# Patient Record
Sex: FEMALE | Race: WHITE | NOT HISPANIC OR LATINO | URBAN - METROPOLITAN AREA
[De-identification: names, ages, dates, MRNs, and addresses within clinical notes are randomized per-mention and may not be internally consistent; named-entity substitution may affect disease eponyms.]

---

## 2020-03-20 ENCOUNTER — INPATIENT (INPATIENT)
Facility: HOSPITAL | Age: 80
LOS: 3 days | Discharge: NURSING, REHAB OR LONG-TERM CARE | DRG: 65 | End: 2020-03-24
Attending: FAMILY MEDICINE | Admitting: FAMILY MEDICINE
Payer: COMMERCIAL

## 2020-03-20 VITALS
OXYGEN SATURATION: 96 % | TEMPERATURE: 98 F | RESPIRATION RATE: 18 BRPM | SYSTOLIC BLOOD PRESSURE: 159 MMHG | DIASTOLIC BLOOD PRESSURE: 76 MMHG | WEIGHT: 151.02 LBS | HEART RATE: 64 BPM

## 2020-03-20 DIAGNOSIS — R93.5 ABNORMAL FINDINGS ON DIAGNOSTIC IMAGING OF OTHER ABDOMINAL REGIONS, INCLUDING RETROPERITONEUM: ICD-10-CM

## 2020-03-20 DIAGNOSIS — I10 ESSENTIAL (PRIMARY) HYPERTENSION: ICD-10-CM

## 2020-03-20 DIAGNOSIS — I63.9 CEREBRAL INFARCTION, UNSPECIFIED: ICD-10-CM

## 2020-03-20 DIAGNOSIS — S22.39XA FRACTURE OF ONE RIB, UNSPECIFIED SIDE, INITIAL ENCOUNTER FOR CLOSED FRACTURE: ICD-10-CM

## 2020-03-20 DIAGNOSIS — S52.209A UNSPECIFIED FRACTURE OF SHAFT OF UNSPECIFIED ULNA, INITIAL ENCOUNTER FOR CLOSED FRACTURE: Chronic | ICD-10-CM

## 2020-03-20 DIAGNOSIS — Z29.9 ENCOUNTER FOR PROPHYLACTIC MEASURES, UNSPECIFIED: ICD-10-CM

## 2020-03-20 DIAGNOSIS — E03.9 HYPOTHYROIDISM, UNSPECIFIED: ICD-10-CM

## 2020-03-20 DIAGNOSIS — R79.89 OTHER SPECIFIED ABNORMAL FINDINGS OF BLOOD CHEMISTRY: ICD-10-CM

## 2020-03-20 LAB
ALBUMIN SERPL ELPH-MCNC: 2.8 G/DL — LOW (ref 3.3–5)
ALP SERPL-CCNC: 45 U/L — SIGNIFICANT CHANGE UP (ref 40–120)
ALT FLD-CCNC: 59 U/L — SIGNIFICANT CHANGE UP (ref 12–78)
ANION GAP SERPL CALC-SCNC: 6 MMOL/L — SIGNIFICANT CHANGE UP (ref 5–17)
AST SERPL-CCNC: 64 U/L — HIGH (ref 15–37)
BASOPHILS # BLD AUTO: 0.03 K/UL — SIGNIFICANT CHANGE UP (ref 0–0.2)
BASOPHILS NFR BLD AUTO: 0.3 % — SIGNIFICANT CHANGE UP (ref 0–2)
BILIRUB SERPL-MCNC: 0.3 MG/DL — SIGNIFICANT CHANGE UP (ref 0.2–1.2)
BUN SERPL-MCNC: 17 MG/DL — SIGNIFICANT CHANGE UP (ref 7–23)
CALCIUM SERPL-MCNC: 9.3 MG/DL — SIGNIFICANT CHANGE UP (ref 8.5–10.1)
CHLORIDE SERPL-SCNC: 108 MMOL/L — SIGNIFICANT CHANGE UP (ref 96–108)
CO2 SERPL-SCNC: 24 MMOL/L — SIGNIFICANT CHANGE UP (ref 22–31)
CREAT SERPL-MCNC: 1.1 MG/DL — SIGNIFICANT CHANGE UP (ref 0.5–1.3)
EOSINOPHIL # BLD AUTO: 0 K/UL — SIGNIFICANT CHANGE UP (ref 0–0.5)
EOSINOPHIL NFR BLD AUTO: 0 % — SIGNIFICANT CHANGE UP (ref 0–6)
GLUCOSE SERPL-MCNC: 123 MG/DL — HIGH (ref 70–99)
HCT VFR BLD CALC: 31.4 % — LOW (ref 34.5–45)
HGB BLD-MCNC: 10.1 G/DL — LOW (ref 11.5–15.5)
IMM GRANULOCYTES NFR BLD AUTO: 0.9 % — SIGNIFICANT CHANGE UP (ref 0–1.5)
INR BLD: 1.21 RATIO — HIGH (ref 0.88–1.16)
LYMPHOCYTES # BLD AUTO: 1.42 K/UL — SIGNIFICANT CHANGE UP (ref 1–3.3)
LYMPHOCYTES # BLD AUTO: 16.3 % — SIGNIFICANT CHANGE UP (ref 13–44)
MCHC RBC-ENTMCNC: 30.3 PG — SIGNIFICANT CHANGE UP (ref 27–34)
MCHC RBC-ENTMCNC: 32.2 GM/DL — SIGNIFICANT CHANGE UP (ref 32–36)
MCV RBC AUTO: 94.3 FL — SIGNIFICANT CHANGE UP (ref 80–100)
MONOCYTES # BLD AUTO: 0.36 K/UL — SIGNIFICANT CHANGE UP (ref 0–0.9)
MONOCYTES NFR BLD AUTO: 4.1 % — SIGNIFICANT CHANGE UP (ref 2–14)
NEUTROPHILS # BLD AUTO: 6.81 K/UL — SIGNIFICANT CHANGE UP (ref 1.8–7.4)
NEUTROPHILS NFR BLD AUTO: 78.4 % — HIGH (ref 43–77)
NRBC # BLD: 0 /100 WBCS — SIGNIFICANT CHANGE UP (ref 0–0)
PLATELET # BLD AUTO: 241 K/UL — SIGNIFICANT CHANGE UP (ref 150–400)
POTASSIUM SERPL-MCNC: 4 MMOL/L — SIGNIFICANT CHANGE UP (ref 3.5–5.3)
POTASSIUM SERPL-SCNC: 4 MMOL/L — SIGNIFICANT CHANGE UP (ref 3.5–5.3)
PROT SERPL-MCNC: 10 G/DL — HIGH (ref 6–8.3)
PROTHROM AB SERPL-ACNC: 13.6 SEC — HIGH (ref 10–12.9)
RBC # BLD: 3.33 M/UL — LOW (ref 3.8–5.2)
RBC # FLD: 13.3 % — SIGNIFICANT CHANGE UP (ref 10.3–14.5)
SODIUM SERPL-SCNC: 138 MMOL/L — SIGNIFICANT CHANGE UP (ref 135–145)
WBC # BLD: 8.7 K/UL — SIGNIFICANT CHANGE UP (ref 3.8–10.5)
WBC # FLD AUTO: 8.7 K/UL — SIGNIFICANT CHANGE UP (ref 3.8–10.5)

## 2020-03-20 PROCEDURE — 70498 CT ANGIOGRAPHY NECK: CPT | Mod: 26

## 2020-03-20 PROCEDURE — 99285 EMERGENCY DEPT VISIT HI MDM: CPT

## 2020-03-20 PROCEDURE — 93306 TTE W/DOPPLER COMPLETE: CPT | Mod: 26

## 2020-03-20 PROCEDURE — 70450 CT HEAD/BRAIN W/O DYE: CPT | Mod: 26,59

## 2020-03-20 PROCEDURE — 71045 X-RAY EXAM CHEST 1 VIEW: CPT | Mod: 26

## 2020-03-20 PROCEDURE — 70496 CT ANGIOGRAPHY HEAD: CPT | Mod: 26

## 2020-03-20 PROCEDURE — 74177 CT ABD & PELVIS W/CONTRAST: CPT | Mod: 26

## 2020-03-20 RX ORDER — ONDANSETRON 8 MG/1
4 TABLET, FILM COATED ORAL ONCE
Refills: 0 | Status: COMPLETED | OUTPATIENT
Start: 2020-03-20 | End: 2020-03-20

## 2020-03-20 RX ORDER — LEVOTHYROXINE SODIUM 125 MCG
25 TABLET ORAL DAILY
Refills: 0 | Status: DISCONTINUED | OUTPATIENT
Start: 2020-03-20 | End: 2020-03-24

## 2020-03-20 RX ORDER — ATORVASTATIN CALCIUM 80 MG/1
40 TABLET, FILM COATED ORAL AT BEDTIME
Refills: 0 | Status: DISCONTINUED | OUTPATIENT
Start: 2020-03-20 | End: 2020-03-24

## 2020-03-20 RX ORDER — ASPIRIN/CALCIUM CARB/MAGNESIUM 324 MG
325 TABLET ORAL DAILY
Refills: 0 | Status: DISCONTINUED | OUTPATIENT
Start: 2020-03-20 | End: 2020-03-24

## 2020-03-20 RX ORDER — SODIUM CHLORIDE 9 MG/ML
1000 INJECTION INTRAMUSCULAR; INTRAVENOUS; SUBCUTANEOUS ONCE
Refills: 0 | Status: COMPLETED | OUTPATIENT
Start: 2020-03-20 | End: 2020-03-20

## 2020-03-20 RX ORDER — ENOXAPARIN SODIUM 100 MG/ML
40 INJECTION SUBCUTANEOUS DAILY
Refills: 0 | Status: DISCONTINUED | OUTPATIENT
Start: 2020-03-20 | End: 2020-03-24

## 2020-03-20 RX ORDER — LEVOTHYROXINE SODIUM 125 MCG
1 TABLET ORAL
Qty: 0 | Refills: 0 | DISCHARGE

## 2020-03-20 RX ORDER — ASPIRIN/CALCIUM CARB/MAGNESIUM 324 MG
325 TABLET ORAL ONCE
Refills: 0 | Status: COMPLETED | OUTPATIENT
Start: 2020-03-20 | End: 2020-03-20

## 2020-03-20 RX ADMIN — Medication 325 MILLIGRAM(S): at 18:53

## 2020-03-20 RX ADMIN — SODIUM CHLORIDE 1000 MILLILITER(S): 9 INJECTION INTRAMUSCULAR; INTRAVENOUS; SUBCUTANEOUS at 16:39

## 2020-03-20 RX ADMIN — ATORVASTATIN CALCIUM 40 MILLIGRAM(S): 80 TABLET, FILM COATED ORAL at 21:33

## 2020-03-20 RX ADMIN — ONDANSETRON 4 MILLIGRAM(S): 8 TABLET, FILM COATED ORAL at 16:39

## 2020-03-20 NOTE — H&P ADULT - PROBLEM SELECTOR PLAN 7
-Lovenox 40mg QD    IMPROVE VTE Individual Risk Assessment  RISK                                                                Points  [  ] Previous VTE                                                  3  [  ] Thrombophilia                                               2  [  ] Lower limb paralysis                                      2        (unable to hold up >15 seconds)    [  ] Current Cancer                                              2         (within 6 months)  [  ] Immobilization > 24 hrs                                1  [  ] ICU/CCU stay > 24 hours                              1  [ X ] Age > 60                                                      1  IMPROVE VTE Score _______1__  IMPROVE Score 0-1: Low Risk, No VTE prophylaxis required for most patients, encourage ambulation.   IMPROVE Score 2-3: At risk, pharmacologic VTE prophylaxis is indicated for most patients (in the absence of a contraindication)  IMPROVE Score > or = 4: High Risk, pharmacologic VTE prophylaxis is indicated for most patients (in the absence of a contraindication)

## 2020-03-20 NOTE — H&P ADULT - PROBLEM SELECTOR PLAN 5
-CT abdomen pelvis w/ IV contrast performed for r/o obstruction, however showed multiple left rib fractures involving the 7th -11th ribs. Fracture of the left posterior 10th rib is comminuted and displaced.  -patient currently not in any pain, saturating well  -continue to monitor saturations -CT abdomen pelvis w/ IV contrast performed for r/o obstruction, however showed multiple left rib fractures involving the 7th -11th ribs. Fracture of the left posterior 10th rib is comminuted and displaced.  -patient currently not in any pain, saturating well  -continue to monitor saturations  -incentive spirometry

## 2020-03-20 NOTE — ED ADULT NURSE NOTE - OBJECTIVE STATEMENT
pt states her leg gave out and she fell, landing on her buttocks. pt also states she had a few second episode of lightheadedness. pt able to follow commands. speaking in clear sentences. pt able to move all extremities.

## 2020-03-20 NOTE — ED ADULT TRIAGE NOTE - CHIEF COMPLAINT QUOTE
Pt bib ems for l sided weakness and slurred speech per family, last checked on patient 1130 am, pt was delayed, but at baseline status

## 2020-03-20 NOTE — H&P ADULT - NSHPPHYSICALEXAM_GEN_ALL_CORE
Physical Exam  General: No apparent distress  Head: normocephalic, atraumatic  Eyes: EOMI, anicteric  ENT: moist mucous membranes, no pharyngeal exudates  Heart: RRR, S1, S2, no murmurs  Chest: CTA b/l, no rales, rhonchi, or wheezes  Abd: BS+, soft, NT, ND  Back: no CVA tenderness  Extr: no edema or cyanosis, 5/5 strength in all 4 extremities (some weakness with  on R arm however baseline s/p ulnar fixation)  Neuro: AA&Ox3, no focal weakness, sensation to light touch intact, CN 2-12 intact, no neurological deficits  Psych: normal affect

## 2020-03-20 NOTE — H&P ADULT - PROBLEM SELECTOR PLAN 1
-admit to tele  -patient with acute right parietal lobe infarct however no neurological deficits present at this time  -patient passed bedside dysphagia screen- will start on full diet  -hold home anti-HTN meds to allow for permissive HTN  -f/u speech and swallow  -f/u MR brain  -s/p asa 325mg in the ED, will continue with asa 325mg   -f/u echo  -f/u TSH, HbA1C, lipid profile  -PT consult  -fall precautions  -neuro, Dr. Shankar, consulted, f/u recs -admit to tele  -patient with acute right parietal lobe infarct however no neurological deficits present at this time  -patient passed bedside dysphagia screen- will start on full diet  -hold home anti-HTN meds to allow for permissive HTN  -start atorvastatin 40mg QD  -f/u speech and swallow  -f/u MR brain  -f/u echo  -s/p asa 325mg in the ED, will continue with asa 325mg   -f/u TSH, HbA1C, lipid profile  -neuro checks   -PT consult  -fall precautions  -neuro, Dr. Shankar, consulted, f/u recs

## 2020-03-20 NOTE — H&P ADULT - ASSESSMENT
80F PMH of HTN and hypothyroidism presenting with generalized weakness, difficulty walking and dizziness. Admitted for acute parietal lobe infarct. 80F PMH of HTN and hypothyroidism presenting with generalized weakness, difficulty walking and dizziness. Admitted for acute right parietal lobe infarct.

## 2020-03-20 NOTE — H&P ADULT - PROBLEM SELECTOR PLAN 6
-patient noted to have focal dilatation of the main pancreatic duct in the head measuring9 mm. Indeterminant 1.5 cm left adrenal nodule. Follow-up nonemergent MRI is recommended.  To be done outpatient.  -Abnormal thickening of the endometrium. Correlate with nonemergent pelvic ultrasound. To be done outpatient.

## 2020-03-20 NOTE — H&P ADULT - NSTOBACCOSCREENHP_GEN_A_CS
Hospital Discharge Documentation  Please phone to schedule a hospital follow up appointment. No discharge summary available at this time, below is the most recent progress note  for your review .       From: [id number] [facility]  Phone: [phone] SOCIAL HISTORY:  No tobacco, alcohol, or drug use. Lives with her family.   Usually independent in her basic activities of daily living.       REVIEW OF SYSTEMS:  The patient describes chest discomfort described as heaviness in the mid sternal area that co d:     08/20/2019 19:25:54  t:      08/20/2019 20:04:21  [facility]   [id number]  FB/               Last signed by: Mikayla Griffiths MD at 8/21/2019 10:35 AM No

## 2020-03-20 NOTE — H&P ADULT - PROBLEM SELECTOR PLAN 2
-patient noted to have elevated trop on admission, likely 2/2 demand ischemia in setting of acute CVA  -will trend for 2 more sets, patient currently asymptomatic  -f/u EKG -patient noted to have elevated trop on admission, likely 2/2 demand ischemia in setting of acute CVA  -will trend for 2 more sets, patient currently asymptomatic  -f/u EKG  -f/u echo -patient noted to have elevated trop on admission, likely 2/2 demand ischemia in setting of acute CVA  -will trend for 2 more sets, patient currently asymptomatic  -f/u EKG  -f/u echo  -Cardio, [facility], consulted, f/u recs

## 2020-03-20 NOTE — H&P ADULT - PROBLEM SELECTOR PLAN 3
-patient on metoprolol 50mg BID at home, will hold for now to allow for permissive HTN  -restart in 48 hours or pending neuro recs

## 2020-03-20 NOTE — H&P ADULT - NSHPSOCIALHISTORY_GEN_ALL_CORE
patient ambulates independently and able to perform ADLs on her own  lives at home with   denies smoking history, EtOH, or illicit drug use

## 2020-03-20 NOTE — ED PROVIDER NOTE - OBJECTIVE STATEMENT
80 female presents to ER by ambulance with daughter at the bedside states patient woke up not feeling well today, having generalized weakness, difficulty walking, dizziness, tried to eat and dry heaving, mild headache.

## 2020-03-20 NOTE — PATIENT PROFILE ADULT - HCP AGENT'S NAME
Jolanta Armstrongaia516- [phone](c) dtr Jolanta Dmespchq252- [phone](c)[phone](w)[phone](w) [place]

## 2020-03-20 NOTE — ED PROVIDER NOTE - CPE EDP CARDIAC NORM
NOTIFICATION RETURN TO WORK / SCHOOL 
 
2/24/2019 10:42 AM 
 
Ms. Gerardo Ramos [facility] [id number] [facility] [address] To Whom It May Concern: 
 
Gerardo Hernandez is currently under the care of [address] [place] [address]. She will return to work/school on: 2/25/19 If there are questions or concerns please have the patient contact our office. Sincerely, Hermelinda Cardoso MD 
 
                                
 
 normal...

## 2020-03-20 NOTE — H&P ADULT - HISTORY OF PRESENT ILLNESS
80 year old F PMH HTN and hypothyroidism presenting with generalized weakness, difficulty walking and dizziness.  Patient states that she was feeling fine yesterday however today felt like she had a frontal headache.  Patient at breakfast this morning and was able to keep it down, however had headache and took tylenol around 11:30am however felt nauseous and went to the bathroom.  She threw up tylenol and then felt a bit lightheaded and was walking towards bedroom when she fell in hallway.  Patient denies palpitations or diaphoresis prior to fall, denies feeling dizzy and states her R leg gave out.  Patient denies head trauma or LOC and was awake during the whole episode, her daughter was called who tried to get her up however she was unable to help get patient up (patient also felt week and was not able to get herself up).  Patient was then brought to ER by ambulance.  Patient states that she currently feels fine, no weakness or dizziness/ lightheadedness, denies any loss of sensation or weakness in any of the extremities.  Patient states that she had R wrist surgery, and therefore has some mild weakness for     In the ED, patient's vitals were: T97.7F, HR 64, /76, RR 18 and SpO2 96% on RA. Significant labs include: H/H 10.1/31.4, AST 64 and ALT 59. Trop .247.   CT Head: Acute right parietal lobe infarct.   CTA: No large vessel occlusion.  CTA head: Trace right cavernous carotid artery calcifications.  The [place] and vertebrobasilar system are unremarkable without evidence of stenosis, occlusion or saccular aneurysm dilation. No evidence for arterial venous malformation. The vertebral arteries are codominant.  CTA NECK: Trace left carotid bulb calcifications. A left-sided aortic arch is demonstrated. There is normal relationship to the great vessels. The common carotid arteries, internal carotid arteries and vertebral arteries are normal in caliber. No evidence of stenosis, occlusion or saccular aneurysm dilation. The vertebral arteries are codominant.  EKG: pending  Pt given 1L normal saline, asa 325mg PO and Zofran 4mg IV x1. 80 year old F PMH HTN and hypothyroidism presenting with generalized weakness, difficulty walking and dizziness.  Patient states that she was feeling fine yesterday however today felt like she had a frontal headache.  Patient at breakfast this morning and was able to keep it down, however had headache and took tylenol around 11:30am however felt nauseous and went to the bathroom.  She threw up tylenol and then felt a bit lightheaded and was walking towards bedroom when she fell in hallway.  Patient denies palpitations or diaphoresis prior to fall, denies feeling dizzy and states her R leg gave out.  Patient denies head trauma or LOC and was awake during the whole episode, her daughter was called who tried to get her up however she was unable to help get patient up (patient also felt week and was not able to get herself up).  Patient was then brought to ER by ambulance.  Patient states that she currently feels fine, no weakness or dizziness/ lightheadedness, denies any loss of sensation or weakness in any of the extremities.  Patient states that she had R wrist surgery, and therefore has some mild weakness however right now is not different from baseline.  Denies chest pain, palpitations, SOB, cough, abdominal pain, not currently nauseous, diarrhea/ constipation, weakness or loss of sensation, no sick contacts or COVID exposure.    In the ED, patient's vitals were: T97.7F, HR 64, /76, RR 18 and SpO2 96% on RA. Significant labs include: H/H 10.1/31.4, AST 64 and ALT 59. Trop .247.   CT Head: Acute right parietal lobe infarct.   CTA: No large vessel occlusion.  CTA head: Trace right cavernous carotid artery calcifications.  The [place] and vertebrobasilar system are unremarkable without evidence of stenosis, occlusion or saccular aneurysm dilation. No evidence for arterial venous malformation. The vertebral arteries are codominant.  CTA NECK: Trace left carotid bulb calcifications. A left-sided aortic arch is demonstrated. There is normal relationship to the great vessels. The common carotid arteries, internal carotid arteries and vertebral arteries are normal in caliber. No evidence of stenosis, occlusion or saccular aneurysm dilation. The vertebral arteries are codominant.  CTA abdomen/ pelvis with IV contrast: Multiple left rib fractures involving the 7th -11th ribs. Fracture of the left posterior 10th rib is comminuted and displaced. Focal dilatation of the main pancreatic duct in the head measuring9 mm. Indeterminant 1.5 cm left adrenal nodule. Follow-up nonemergent MRI is recommended. Abnormal thickening of the endometrium. Correlate with nonemergent pelvic ultrasound.  EKG: pending  Pt given 1L normal saline, asa 325mg PO and Zofran 4mg IV x1.

## 2020-03-20 NOTE — H&P ADULT - NSHPREVIEWOFSYSTEMS_GEN_ALL_CORE
Review of Systems  General: no fever, chills  Eyes: no vision changes  ENT: no hearing changes, nasal congestions, or sore throat  CV: no chest pain or palpitations  Pulm: no SOB, wheezing, cough, or hemoptysis  Abd/GI: no nausea, vomiting, diarrhea, constipation, abd pain  : no dysuria, hematuria, urinary frequency  MSK: no joint pain or myalgias  Neuro: no syncope, dizziness, focal weakness  Psych: no anxiety or depression  Endo: no heat or cold intolerance

## 2020-03-21 ENCOUNTER — TRANSCRIPTION ENCOUNTER (OUTPATIENT)
Age: 80
End: 2020-03-21

## 2020-03-21 LAB
ANION GAP SERPL CALC-SCNC: 8 MMOL/L — SIGNIFICANT CHANGE UP (ref 5–17)
BUN SERPL-MCNC: 20 MG/DL — SIGNIFICANT CHANGE UP (ref 7–23)
CALCIUM SERPL-MCNC: 9.3 MG/DL — SIGNIFICANT CHANGE UP (ref 8.5–10.1)
CHLORIDE SERPL-SCNC: 109 MMOL/L — HIGH (ref 96–108)
CHOLEST SERPL-MCNC: 168 MG/DL — SIGNIFICANT CHANGE UP (ref 10–199)
CK MB BLD-MCNC: 2.2 % — SIGNIFICANT CHANGE UP (ref 0–3.5)
CK MB BLD-MCNC: 2.8 % — SIGNIFICANT CHANGE UP (ref 0–3.5)
CK MB CFR SERPL CALC: 2 NG/ML — SIGNIFICANT CHANGE UP (ref 0–3.6)
CK MB CFR SERPL CALC: 2.7 NG/ML — SIGNIFICANT CHANGE UP (ref 0–3.6)
CK SERPL-CCNC: 93 U/L — SIGNIFICANT CHANGE UP (ref 26–192)
CK SERPL-CCNC: 97 U/L — SIGNIFICANT CHANGE UP (ref 26–192)
CO2 SERPL-SCNC: 21 MMOL/L — LOW (ref 22–31)
CREAT SERPL-MCNC: 1.1 MG/DL — SIGNIFICANT CHANGE UP (ref 0.5–1.3)
GLUCOSE SERPL-MCNC: 86 MG/DL — SIGNIFICANT CHANGE UP (ref 70–99)
HBA1C BLD-MCNC: 5.8 % — HIGH (ref 4–5.6)
HCT VFR BLD CALC: 30.4 % — LOW (ref 34.5–45)
HDLC SERPL-MCNC: 49 MG/DL — LOW
HGB BLD-MCNC: 9.7 G/DL — LOW (ref 11.5–15.5)
LIPID PNL WITH DIRECT LDL SERPL: 95 MG/DL — SIGNIFICANT CHANGE UP
MCHC RBC-ENTMCNC: 30.4 PG — SIGNIFICANT CHANGE UP (ref 27–34)
MCHC RBC-ENTMCNC: 31.9 GM/DL — LOW (ref 32–36)
MCV RBC AUTO: 95.3 FL — SIGNIFICANT CHANGE UP (ref 80–100)
NRBC # BLD: 0 /100 WBCS — SIGNIFICANT CHANGE UP (ref 0–0)
PLATELET # BLD AUTO: 223 K/UL — SIGNIFICANT CHANGE UP (ref 150–400)
POTASSIUM SERPL-MCNC: 3.9 MMOL/L — SIGNIFICANT CHANGE UP (ref 3.5–5.3)
POTASSIUM SERPL-SCNC: 3.9 MMOL/L — SIGNIFICANT CHANGE UP (ref 3.5–5.3)
RBC # BLD: 3.19 M/UL — LOW (ref 3.8–5.2)
RBC # FLD: 13.4 % — SIGNIFICANT CHANGE UP (ref 10.3–14.5)
SODIUM SERPL-SCNC: 138 MMOL/L — SIGNIFICANT CHANGE UP (ref 135–145)
TOTAL CHOLESTEROL/HDL RATIO MEASUREMENT: 3.4 RATIO — SIGNIFICANT CHANGE UP (ref 3.3–7.1)
TRIGL SERPL-MCNC: 119 MG/DL — SIGNIFICANT CHANGE UP (ref 10–149)
TROPONIN I SERPL-MCNC: 0.38 NG/ML — HIGH (ref 0.01–0.04)
TROPONIN I SERPL-MCNC: 0.42 NG/ML — HIGH (ref 0.01–0.04)
TSH SERPL-MCNC: 1.89 UIU/ML — SIGNIFICANT CHANGE UP (ref 0.36–3.74)
WBC # BLD: 8.76 K/UL — SIGNIFICANT CHANGE UP (ref 3.8–10.5)
WBC # FLD AUTO: 8.76 K/UL — SIGNIFICANT CHANGE UP (ref 3.8–10.5)

## 2020-03-21 PROCEDURE — 99233 SBSQ HOSP IP/OBS HIGH 50: CPT

## 2020-03-21 PROCEDURE — 70551 MRI BRAIN STEM W/O DYE: CPT | Mod: 26

## 2020-03-21 PROCEDURE — 99223 1ST HOSP IP/OBS HIGH 75: CPT

## 2020-03-21 RX ADMIN — Medication 25 MICROGRAM(S): at 05:33

## 2020-03-21 RX ADMIN — ATORVASTATIN CALCIUM 40 MILLIGRAM(S): 80 TABLET, FILM COATED ORAL at 22:38

## 2020-03-21 RX ADMIN — ENOXAPARIN SODIUM 40 MILLIGRAM(S): 100 INJECTION SUBCUTANEOUS at 12:37

## 2020-03-21 RX ADMIN — Medication 325 MILLIGRAM(S): at 12:37

## 2020-03-21 NOTE — CONSULT NOTE ADULT - SUBJECTIVE AND OBJECTIVE BOX
Clinical impression is most likely right hemispheric cerebrovascular accident.    I would recommend telemetry evaluation to rule out underlying arrhythmia.  I would recommend echocardiogram to evaluate ejection fraction.  I would recommend MRI of the brain.  I would recommend to check TSH,lipid panel  and A1C  Avoid hypotension, allow permissive hypertension, keep SBP above 150 and below 190 if possible   Would recommend aspirin 325 once a day for 3 weeks and can cut down to 81.  I will recommend cholesterol medications.  I will get Physical Therapy and Occupational Therapy.  spoke to family

## 2020-03-21 NOTE — SPEECH LANGUAGE PATHOLOGY EVALUATION - COMMENTS
Chart reviewed order received for speech evaluation.  Pt received sleeping in bed, arousable with cues, lethargic, cues required to maintain arousal, Ox2, reduced cognition, reduced attention to left, +dysarthria, pain scale 0/10 pre & post eval.   Attempted to complete speech eval, however, due to lethargy, unable to be completed at this time.  Pt left as received NAD RN & MD notified.  Will follow to complete speech eval as schedule permits.

## 2020-03-21 NOTE — SWALLOW BEDSIDE ASSESSMENT ADULT - SLP GENERAL OBSERVATIONS
Pt received sleeping in bed, arousable with cues, lethargic, cues required to maintain arousal, Ox2, reduced cognition, reduced attention to left, +dysarthria, pain scale 0/10 pre & post eval

## 2020-03-21 NOTE — DISCHARGE NOTE PROVIDER - NSDCCPCAREPLAN_GEN_ALL_CORE_FT
PRINCIPAL DISCHARGE DIAGNOSIS  Diagnosis: Acute cerebrovascular accident (CVA)  Assessment and Plan of Treatment: You had a right parietal stroke. You have been improving and will continue you rehab in acute rehab facility. There was some evidence of atrial fibrillation on telemetry monitoring and you should start on anticoagulation in the near future. As per neurologist, you should take aspirin 325mg once daily for the next 10 days and on 4/4/20, if approved by cardiologist, you can STOP aspirin and start a full dose anticoagulant like Eliquis. You must see Dr. Walsh (cardiologist, number below) in early April to make that final decision and give you recommendation on blood thinner to start.   Your swallowing was affected by the stroke and for now speech and swallow therapists recommend a dysphagia 1 (pureed solids) with nectar-consistency thickened liquids for now. As you recover, consider another evaluation by speech and swallow therapist to see whether you can advance the diet further.  Follow up with neurologist, Dr. Marie (number below) after rehab.      SECONDARY DISCHARGE DIAGNOSES  Diagnosis: Paraproteinemia  Assessment and Plan of Treatment: You were found to have an abnormal protein in your blood that will need further evaluation with hematologist, Dr. Escobar (number below) as outpatient. Please follow up with hematologist as soon as you are you are out of rehab.    Diagnosis: Abnormal CT of the abdomen  Assessment and Plan of Treatment: You had several incidental findings on CT Abdomen that you will need to follow up as outpatient after rehab. You had thickening in the endometrium, an adrenal nodule, and some dilatation of a pancreatic duct. Please show your CT report and CT scan disc that was provided to your PMD after discharge from rehab and can help direct you to any appropriate spcialist or scans.    Diagnosis: Essential hypertension  Assessment and Plan of Treatment: Decrese your metoprolol dose to 25mg twice daily for now. Monitor BP in rehab. Follow up with your PMD. PRINCIPAL DISCHARGE DIAGNOSIS  Diagnosis: Acute cerebrovascular accident (CVA)  Assessment and Plan of Treatment: You had a right parietal stroke. You have been improving and will continue you rehab in acute rehab facility. There was some evidence of atrial fibrillation on telemetry monitoring and you should start on anticoagulation in the near future. As per neurologist, you should take aspirin 325mg once daily for the next 10 days and on 4/4/20, if approved by cardiologist, you can STOP aspirin and start a full dose anticoagulant like Eliquis. You must see Dr. Walsh (cardiologist, number below) in early April to make that final decision and give you recommendation on blood thinner to start. Start taking lipitor as prescribed.  Your swallowing was affected by the stroke and for now speech and swallow therapists recommend a dysphagia 1 (pureed solids) with nectar-consistency thickened liquids for now. As you recover, consider another evaluation by speech and swallow therapist to see whether you can advance the diet further.  Follow up with neurologist, Dr. Marie (number below) after rehab.      SECONDARY DISCHARGE DIAGNOSES  Diagnosis: Paraproteinemia  Assessment and Plan of Treatment: You were found to have an abnormal protein in your blood that will need further evaluation with hematologist, Dr. Escobar (number below) as outpatient. Please follow up with hematologist as soon as you are you are out of rehab.    Diagnosis: Abnormal CT of the abdomen  Assessment and Plan of Treatment: You had several incidental findings on CT Abdomen that you will need to follow up as outpatient after rehab. You had thickening in the endometrium, an adrenal nodule, and some dilatation of a pancreatic duct. Please show your CT report and CT scan disc that was provided to your PMD after discharge from rehab and can help direct you to any appropriate spcialist or scans.    Diagnosis: Essential hypertension  Assessment and Plan of Treatment: Decrese your metoprolol dose to 25mg twice daily for now. Monitor BP in rehab. Follow up with your PMD.    Diagnosis: Hypophosphatemia  Assessment and Plan of Treatment: Your phosphorus level was low. Take the potassium phosphate supplement with food for TWO DAYS ONLY. Recheck electrolytes in rehab.

## 2020-03-21 NOTE — PROGRESS NOTE ADULT - PROBLEM SELECTOR PLAN 5
-CT abdomen pelvis w/ IV contrast performed for r/o obstruction, however showed multiple left rib fractures involving the 7th -11th ribs. Fracture of the left posterior 10th rib is comminuted and displaced.  -patient currently not in any pain, saturating well  -continue to monitor saturations  -incentive spirometry  -will assess acuity of rib fx with radiology, consider pulm consult

## 2020-03-21 NOTE — PROGRESS NOTE ADULT - PROBLEM SELECTOR PLAN 6
-patient noted to have focal dilatation of the main pancreatic duct in the head measuring 9 mm. Indeterminant 1.5 cm left adrenal nodule. Follow-up nonemergent MRI is recommended.  To be done outpatient.  -Abnormal thickening of the endometrium. Correlate with nonemergent pelvic ultrasound. To be done outpatient.

## 2020-03-21 NOTE — DISCHARGE NOTE PROVIDER - NSDCMRMEDTOKEN_GEN_ALL_CORE_FT
levothyroxine 25 mcg (0.025 mg) oral tablet: 1 tab(s) orally once a day  Metoprolol Tartrate 50 mg oral tablet: 1 tab(s) orally 2 times a day aspirin 325 mg oral delayed release tablet: 1 tab(s) orally once a day  atorvastatin 40 mg oral tablet: 1 tab(s) orally once a day (at bedtime)  enoxaparin: 40 milligram(s) subcutaneous once a day  levothyroxine 25 mcg (0.025 mg) oral tablet: 1 tab(s) orally once a day  metoprolol tartrate 25 mg oral tablet: 1 tab(s) orally 2 times a day  potassium phosphate-sodium phosphate 305 mg-700 mg oral tablet: 1 tab(s) orally 3 times a day (with meals) FOR TWO DAYS ONLY

## 2020-03-21 NOTE — DISCHARGE NOTE PROVIDER - CARE PROVIDERS DIRECT ADDRESSES
,[email or web address],DirectAddress_Unknown,DirectAddress_Unknown

## 2020-03-21 NOTE — DISCHARGE NOTE PROVIDER - PROVIDER TOKENS
PROVIDER:[TOKEN:[[id number]]],PROVIDER:[TOKEN:[[id number]]],PROVIDER:[TOKEN:[[id number]]]

## 2020-03-21 NOTE — CONSULT NOTE ADULT - ASSESSMENT
Patient is admitted with an acute right hemispheric stroke currently undergoing neurology evaluation. The etiology of her stroke remains unclear with no clear carotid stenosis and the possibility of a cardioembolic phenomenon. Preliminary echocardiography does not reveal any evidence of a cardioembolic cause for her event. Her troponin is elevated but does not reveal a curve consistent with acute atherothrombosis. This is of no clinical significance at present. She remains hemodynamically stable currently in sinus rhythm    Recommend    Continue telemetry to evaluate for dysrhythmias    May consider implantable loop recorder in the future    May consider transesophageal echocardiography in the future    Blood pressure control with permissive hypertension, systolic 150-190    Continue statin    Antiplatelet therapy per neurology    Further management can be dependent on her clinical course

## 2020-03-21 NOTE — DISCHARGE NOTE PROVIDER - NSDCQMSTROKE_NEU_ALL_CORE
Yes... Uncomplicated asthma, unspecified asthma severity Uncomplicated asthma, unspecified asthma severity Uncomplicated asthma, unspecified asthma severity Uncomplicated asthma, unspecified asthma severity

## 2020-03-21 NOTE — DISCHARGE NOTE PROVIDER - HOSPITAL COURSE
FROM ADMISSION H+P:     HPI:    80 year old F PMH HTN and hypothyroidism presenting with generalized weakness, difficulty walking and dizziness.  Patient states that she was feeling fine yesterday however today felt like she had a frontal headache.  Patient at breakfast this morning and was able to keep it down, however had headache and took tylenol around 11:30am however felt nauseous and went to the bathroom.  She threw up tylenol and then felt a bit lightheaded and was walking towards bedroom when she fell in hallway.  Patient denies palpitations or diaphoresis prior to fall, denies feeling dizzy and states her R leg gave out.  Patient denies head trauma or LOC and was awake during the whole episode, her daughter was called who tried to get her up however she was unable to help get patient up (patient also felt week and was not able to get herself up).  Patient was then brought to ER by ambulance.  Patient states that she currently feels fine, no weakness or dizziness/ lightheadedness, denies any loss of sensation or weakness in any of the extremities.  Patient states that she had R wrist surgery, and therefore has some mild weakness however right now is not different from baseline.  Denies chest pain, palpitations, SOB, cough, abdominal pain, not currently nauseous, diarrhea/ constipation, weakness or loss of sensation, no sick contacts or COVID exposure.        In the ED, patient's vitals were: T97.7F, HR 64, /76, RR 18 and SpO2 96% on RA. Significant labs include: H/H 10.1/31.4, AST 64 and ALT 59. Trop .247.     CT Head: Acute right parietal lobe infarct.     CTA: No large vessel occlusion.    CTA head: Trace right cavernous carotid artery calcifications.    The [place] and vertebrobasilar system are unremarkable without evidence of stenosis, occlusion or saccular aneurysm dilation. No evidence for arterial venous malformation. The vertebral arteries are codominant.    CTA NECK: Trace left carotid bulb calcifications. A left-sided aortic arch is demonstrated. There is normal relationship to the great vessels. The common carotid arteries, internal carotid arteries and vertebral arteries are normal in caliber. No evidence of stenosis, occlusion or saccular aneurysm dilation. The vertebral arteries are codominant.    CTA abdomen/ pelvis with IV contrast: Multiple left rib fractures involving the 7th -11th ribs. Fracture of the left posterior 10th rib is comminuted and displaced. Focal dilatation of the main pancreatic duct in the head measuring9 mm. Indeterminant 1.5 cm left adrenal nodule. Follow-up nonemergent MRI is recommended. Abnormal thickening of the endometrium. Correlate with nonemergent pelvic ultrasound.    EKG: pending    Pt given 1L normal saline, asa 325mg PO and Zofran 4mg IV x1. (20 Mar 2020 18:42)            ---    HOSPITAL COURSE:     The patient was admitted to telemetry for management of her right parietal lobe infarct and paroxysmal atrial fibrillation. Her home BP meds were held to allow for permissive hypertension; however the patient's blood pressure was not particularly high so she was started on NS as per neuro (Dr Shankar) recommendations. She was started on  mg and atorvastatin 40 mg qhs. In the setting of her paroxysmal a fib, it was recommended that the patient not be started on full dose AC for three weeks due to risk of hemorrhagic conversion of infarct, as per neuro recommendations. The patient was evaluated by speech and swallow, who recommended a dysphagia 1 pureed diet with no liquids, with eventual addition of nectar thick liquids. TTE was performed which showed normal systolic LV function, LVEF of 55-60%, + diastolic dysfunction. Carotid arteries were without significant stenosis.         Patient had elevated troponin on admission; likely secondary to demand ischemia in the setting of CVA. She had some episodes of paroxysmal afib on the monitor. As per discussion with cardio (Dr Randle), patient could benefit from ILR for further evaluation of paroxysmal afib, as well as outpatient stress test.         CT A/P showed rib fractures involving the 7-11th ribs; patient denied any pain throughout her hospital course and was saturating well. Pulm (Dr Zamorano) was consulted; recommended that the patient use incentive spirometer.         Patient noted to have significant difference between albumin and total protein. To evaluate for paraproteinemias, heme/onc was consulted.         PT evaluated the patient and recommended that the patient could benefit from acute rehab.         ---    CONSULTANTS:     Neuro (Dr Polo)     Pulm (Dr Zamorano)     Cardio (Dr Randle)     Heme/Onc:         ---    TIME SPENT:    The total amount of time spent reviewing the hospital notes, laboratory values, imaging findings, assessing/counseling the patient, discussing with consultant physicians, social work, nursing staff was -- minutes        ---    Primary care provider was made aware of plan for discharge:      [  ] NO     [  ] YES FROM ADMISSION H+P:     HPI:    80 year old F PMH HTN and hypothyroidism presenting with generalized weakness, difficulty walking and dizziness.  Patient states that she was feeling fine yesterday however today felt like she had a frontal headache.  Patient at breakfast this morning and was able to keep it down, however had headache and took tylenol around 11:30am however felt nauseous and went to the bathroom.  She threw up tylenol and then felt a bit lightheaded and was walking towards bedroom when she fell in hallway.  Patient denies palpitations or diaphoresis prior to fall, denies feeling dizzy and states her R leg gave out.  Patient denies head trauma or LOC and was awake during the whole episode, her daughter was called who tried to get her up however she was unable to help get patient up (patient also felt week and was not able to get herself up).  Patient was then brought to ER by ambulance.  Patient states that she currently feels fine, no weakness or dizziness/ lightheadedness, denies any loss of sensation or weakness in any of the extremities.  Patient states that she had R wrist surgery, and therefore has some mild weakness however right now is not different from baseline.  Denies chest pain, palpitations, SOB, cough, abdominal pain, not currently nauseous, diarrhea/ constipation, weakness or loss of sensation, no sick contacts or COVID exposure.        In the ED, patient's vitals were: T97.7F, HR 64, /76, RR 18 and SpO2 96% on RA. Significant labs include: H/H 10.1/31.4, AST 64 and ALT 59. Trop .247.     CT Head: Acute right parietal lobe infarct.     CTA: No large vessel occlusion.    CTA head: Trace right cavernous carotid artery calcifications.    The [place] of [place] and vertebrobasilar system are unremarkable without evidence of stenosis, occlusion or saccular aneurysm dilation. No evidence for arterial venous malformation. The vertebral arteries are codominant.    CTA NECK: Trace left carotid bulb calcifications. A left-sided aortic arch is demonstrated. There is normal relationship to the great vessels. The common carotid arteries, internal carotid arteries and vertebral arteries are normal in caliber. No evidence of stenosis, occlusion or saccular aneurysm dilation. The vertebral arteries are codominant.    CTA abdomen/ pelvis with IV contrast: Multiple left rib fractures involving the 7th -11th ribs. Fracture of the left posterior 10th rib is comminuted and displaced. Focal dilatation of the main pancreatic duct in the head measuring9 mm. Indeterminant 1.5 cm left adrenal nodule. Follow-up nonemergent MRI is recommended. Abnormal thickening of the endometrium. Correlate with nonemergent pelvic ultrasound.    EKG: pending    Pt given 1L normal saline, asa 325mg PO and Zofran 4mg IV x1. (20 Mar 2020 18:42)            ---    HOSPITAL COURSE:     The patient was admitted to telemetry for management of her right parietal lobe infarct and paroxysmal atrial fibrillation. Her home BP meds were held to allow for permissive hypertension; however the patient's blood pressure was not particularly high so she was started on NS as per neuro (Dr Shankar) recommendations. She was started on  mg and atorvastatin 40 mg qhs. In the setting of her paroxysmal a fib, it was recommended that the patient not be started on full dose AC for three weeks due to risk of hemorrhagic conversion of infarct, as per neuro recommendations. The patient was evaluated by speech and swallow, who recommended a dysphagia 1 pureed diet with no liquids, with eventual addition of nectar thick liquids. TTE was performed which showed normal systolic LV function, LVEF of 55-60%, + diastolic dysfunction. Carotid arteries were without significant stenosis.         Patient had elevated troponin on admission; likely secondary to demand ischemia in the setting of CVA. She had some episodes of paroxysmal afib on the monitor. As per discussion with cardio (Dr Randle), patient could benefit from ILR for further evaluation of paroxysmal afib, as well as outpatient stress test.         CT A/P showed rib fractures involving the 7-11th ribs; patient denied any pain throughout her hospital course and was saturating well. Pulm (Dr Zamorano) was consulted; recommended that the patient use incentive spirometer.         Patient noted to have significant difference between albumin and total protein. To evaluate for paraproteinemias, it was recommended that the patient follow up with heme/onc as an outpatient.         PT evaluated the patient and recommended that the patient could benefit from acute rehab.         ---    CONSULTANTS:     Neuro (Dr Polo)     Pulm (Dr Zamorano)     Cardio (Dr Randle)     Heme/Onc:         ---    TIME SPENT:    The total amount of time spent reviewing the hospital notes, laboratory values, imaging findings, assessing/counseling the patient, discussing with consultant physicians, social work, nursing staff was -- minutes        ---    Primary care provider was made aware of plan for discharge:      [  ] NO     [  ] YES FROM ADMISSION H+P:     HPI:    80 year old F PMH HTN and hypothyroidism presenting with generalized weakness, difficulty walking and dizziness.  Patient states that she was feeling fine yesterday however today felt like she had a frontal headache.  Patient at breakfast this morning and was able to keep it down, however had headache and took tylenol around 11:30am however felt nauseous and went to the bathroom.  She threw up tylenol and then felt a bit lightheaded and was walking towards bedroom when she fell in hallway.  Patient denies palpitations or diaphoresis prior to fall, denies feeling dizzy and states her R leg gave out.  Patient denies head trauma or LOC and was awake during the whole episode, her daughter was called who tried to get her up however she was unable to help get patient up (patient also felt week and was not able to get herself up).  Patient was then brought to ER by ambulance.  Patient states that she currently feels fine, no weakness or dizziness/ lightheadedness, denies any loss of sensation or weakness in any of the extremities.  Patient states that she had R wrist surgery, and therefore has some mild weakness however right now is not different from baseline.  Denies chest pain, palpitations, SOB, cough, abdominal pain, not currently nauseous, diarrhea/ constipation, weakness or loss of sensation, no sick contacts or COVID exposure.        In the ED, patient's vitals were: T97.7F, HR 64, /76, RR 18 and SpO2 96% on RA. Significant labs include: H/H 10.1/31.4, AST 64 and ALT 59. Trop .247.     CT Head: Acute right parietal lobe infarct.     CTA: No large vessel occlusion.    CTA head: Trace right cavernous carotid artery calcifications.    The [place] and vertebrobasilar system are unremarkable without evidence of stenosis, occlusion or saccular aneurysm dilation. No evidence for arterial venous malformation. The vertebral arteries are codominant.    CTA NECK: Trace left carotid bulb calcifications. A left-sided aortic arch is demonstrated. There is normal relationship to the great vessels. The common carotid arteries, internal carotid arteries and vertebral arteries are normal in caliber. No evidence of stenosis, occlusion or saccular aneurysm dilation. The vertebral arteries are codominant.    CTA abdomen/ pelvis with IV contrast: Multiple left rib fractures involving the 7th -11th ribs. Fracture of the left posterior 10th rib is comminuted and displaced. Focal dilatation of the main pancreatic duct in the head measuring9 mm. Indeterminant 1.5 cm left adrenal nodule. Follow-up nonemergent MRI is recommended. Abnormal thickening of the endometrium. Correlate with nonemergent pelvic ultrasound.    EKG: pending    Pt given 1L normal saline, asa 325mg PO and Zofran 4mg IV x1. (20 Mar 2020 18:42)            ---    HOSPITAL COURSE:     The patient was admitted to telemetry for management of her right parietal lobe infarct and paroxysmal atrial fibrillation. Her home BP meds were held to allow for permissive hypertension; however the patient's blood pressure was not particularly high so she was started on NS as per neuro (Dr Shankar) recommendations. She was started on  mg and atorvastatin 40 mg qhs. In the setting of her paroxysmal a fib, it was recommended that the patient not be started on full dose AC for three weeks due to risk of hemorrhagic conversion of infarct, as per neuro recommendations. The patient was evaluated by speech and swallow, who recommended a dysphagia 1 pureed diet with no liquids, with eventual addition of nectar thick liquids. TTE was performed which showed normal systolic LV function, LVEF of 55-60%, + diastolic dysfunction. Carotid arteries were without significant stenosis.         Patient had elevated troponin on admission; likely secondary to demand ischemia in the setting of CVA. She had some episodes of paroxysmal afib, PAT, and PACs on the monitor. As per discussion with cardio (Dr Randle), patient could benefit from ILR for further evaluation of paroxysmal afib, as well as outpatient stress test. It was also determined that in the future, the patient could benefit for SMILEY with bubble study to r/o PFO as cause of her CVAs.         CT A/P showed rib fractures involving the 7-11th ribs; patient denied any pain throughout her hospital course and was saturating well. Pulm (Dr Zamorano) was consulted; recommended that the patient use incentive spirometer.         Patient noted to have significant difference between albumin and total protein. To evaluate for paraproteinemias, heme/onc was consulted (Dr Escobar) who recommended         PT evaluated the patient and recommended that the patient could benefit from acute rehab.         ---    CONSULTANTS:     Neuro (Dr Polo)     Pulm (Dr Zamorano)     Cardio (Dr Jer)     Heme/Onc:         ---    TIME SPENT:    The total amount of time spent reviewing the hospital notes, laboratory values, imaging findings, assessing/counseling the patient, discussing with consultant physicians, social work, nursing staff was -- minutes        ---    Primary care provider was made aware of plan for discharge:      [  ] NO     [  ] YES FROM ADMISSION H+P:     HPI:    80 year old F PMH HTN and hypothyroidism presenting with generalized weakness, difficulty walking and dizziness.  Patient states that she was feeling fine yesterday however today felt like she had a frontal headache.  Patient at breakfast this morning and was able to keep it down, however had headache and took tylenol around 11:30am however felt nauseous and went to the bathroom.  She threw up tylenol and then felt a bit lightheaded and was walking towards bedroom when she fell in hallway.  Patient denies palpitations or diaphoresis prior to fall, denies feeling dizzy and states her R leg gave out.  Patient denies head trauma or LOC and was awake during the whole episode, her daughter was called who tried to get her up however she was unable to help get patient up (patient also felt week and was not able to get herself up).  Patient was then brought to ER by ambulance.  Patient states that she currently feels fine, no weakness or dizziness/ lightheadedness, denies any loss of sensation or weakness in any of the extremities.  Patient states that she had R wrist surgery, and therefore has some mild weakness however right now is not different from baseline.  Denies chest pain, palpitations, SOB, cough, abdominal pain, not currently nauseous, diarrhea/ constipation, weakness or loss of sensation, no sick contacts or COVID exposure.        In the ED, patient's vitals were: T97.7F, HR 64, /76, RR 18 and SpO2 96% on RA. Significant labs include: H/H 10.1/31.4, AST 64 and ALT 59. Trop .247.     CT Head: Acute right parietal lobe infarct.     CTA: No large vessel occlusion.    CTA head: Trace right cavernous carotid artery calcifications.    The [place] and vertebrobasilar system are unremarkable without evidence of stenosis, occlusion or saccular aneurysm dilation. No evidence for arterial venous malformation. The vertebral arteries are codominant.    CTA NECK: Trace left carotid bulb calcifications. A left-sided aortic arch is demonstrated. There is normal relationship to the great vessels. The common carotid arteries, internal carotid arteries and vertebral arteries are normal in caliber. No evidence of stenosis, occlusion or saccular aneurysm dilation. The vertebral arteries are codominant.    CTA abdomen/ pelvis with IV contrast: Multiple left rib fractures involving the 7th -11th ribs. Fracture of the left posterior 10th rib is comminuted and displaced. Focal dilatation of the main pancreatic duct in the head measuring9 mm. Indeterminant 1.5 cm left adrenal nodule. Follow-up nonemergent MRI is recommended. Abnormal thickening of the endometrium. Correlate with nonemergent pelvic ultrasound.    EKG: pending    Pt given 1L normal saline, asa 325mg PO and Zofran 4mg IV x1. (20 Mar 2020 18:42)            ---    HOSPITAL COURSE:     The patient was admitted to telemetry for management of her acute right parietal CVA. Her home BP meds were held to allow for permissive hypertension; however the patient's blood pressure was not particularly high so she was started on NS as per neuro (Dr Shankar) recommendations. She was started on ASA 325mg and atorvastatin 40mg QHS. The patient was evaluated by speech and swallow, who recommended a dysphagia 1 pureed diet with no liquids, on reassessment later in admission, pt did better and recommended pureed diet with nectar thick liquids. Phys therapy rec acute rehab. On telemetry pt was noted to have PAT and a couple of brief instances suspicious for paroxysmal afib. Cardio (Nico) recommended A/C after cleared by neurology as long as pt can follow up in the office prior to initiating the A/C. Neuro recommended 2 weeks of ASA 325mg prior to transitioning to full A/C as concern was for potential hemorrhagic conversion if A/C started earlier than that. TTE was performed which showed normal systolic LV function, LVEF of 55-60%, + diastolic dysfunction. Carotid arteries were without significant stenosis.         Patient had mildly elevated troponin on admission; likely secondary to demand ischemia in the setting of CVA, not consistent with plaque rupture and will have outpt cardio f/up.         CT A/P noted rib fractures involving the posterior left-sided 7-11th ribs; patient denied any pain throughout her hospital course and was saturating well. Pulm (Dr Zamorano) was consulted; recommended that the patient use incentive spirometer.         Patient noted to have significant difference between albumin and total protein. To evaluate for paraproteinemia, ordered SPEP and serum immunofixation which showed a IgG kappa band. Heme/onc was consulted (Dr Escobar) who recommended outpatient f/up for further w/up when pt stable from stroke perspective and out of rehab. Rec against treatment at this time even if pt does have multiple myeloma.         CT Abd/P also had a few other incidental findings including thickened endometrium, adrenal nodule, pancreatic duct dilatation which were all discussed with pt and pt's family who will f/up as outpatient. Disc provided with the CT for f/up.         Pt improved and was discharged to acute rehab.        ---    CONSULTANTS:     Neuro (Dr Shankar)     Pulm (Dr Zamorano)     Cardio (Dr Radnle)     Heme/Onc: (Dr Esocbar)        On day of discharge:    ROS: pt states she feels well. Denies headache, fever, chills, chest pain, rib pain, SOB.    Vital Signs Last 24 Hrs    T(C): 36.6 (24 Mar 2020 11:48), Max: 37.4 (24 Mar 2020 04:35)    T(F): 97.9 (24 Mar 2020 11:48), Max: 99.4 (24 Mar 2020 04:35)    HR: 70 (24 Mar 2020 11:48) (65 - 85)    BP: 137/82 (24 Mar 2020 11:48) (137/82 - 160/100)    BP(mean): --    RR: 20 (24 Mar 2020 11:48) (18 - 21)    SpO2: 93% (24 Mar 2020 11:48) (91% - 96%)        PHYSICAL EXAM:    GENERAL: No acute distress at rest    HEENT:  anicteric, moist mucous membranes    CHEST/LUNG:  CTA b/l, no rales, wheezes, or rhonchi    HEART:  RRR, S1, S2    ABDOMEN:  BS+, soft, nontender, nondistended    EXTREMITIES: no edema, cyanosis, or calf tenderness    NERVOUS SYSTEM: AA&Ox3 (knows exact date even), CN intact aside from left facial droop, 3+/5 strength in L UE, 5/5 in R UE; 4+/5 in R LE, 4/5 in L LE. left-sided neglect. Sensation to light touch intact.        ---    TIME SPENT: 45min

## 2020-03-21 NOTE — PROGRESS NOTE ADULT - PROBLEM SELECTOR PLAN 2
-patient noted to have elevated trop on admission, likely 2/2 demand ischemia in setting of acute CVA  -will trend for 2 more sets, patient currently asymptomatic, normal CK and slight upward trend not consistent with plaque rupture ACS  -f/u EKG  -TTE showing normal systolic LV function, LVEF of 55-60%, +diastolic dysfunction, moderate MR   -cardio Dr. Randle group -- recs appreciated

## 2020-03-21 NOTE — CONSULT NOTE ADULT - SUBJECTIVE AND OBJECTIVE BOX
[facility] Consultation    CHIEF COMPLAINT: Patient is a 80y old  Female who presents with a chief complaint of acute CVA (21 Mar 2020 09:47)      HPI:  80 year old F PMH HTN and hypothyroidism presenting with generalized weakness, difficulty walking and dizziness.  Patient states that she was feeling fine yesterday however today felt like she had a frontal headache.  Patient at breakfast this morning and was able to keep it down, however had headache and took tylenol around 11:30am however felt nauseous and went to the bathroom.  She threw up tylenol and then felt a bit lightheaded and was walking towards bedroom when she fell in hallway.  Patient denies palpitations or diaphoresis prior to fall, denies feeling dizzy and states her R leg gave out.  Patient denies head trauma or LOC and was awake during the whole episode, her daughter was called who tried to get her up however she was unable to help get patient up (patient also felt week and was not able to get herself up).  Patient was then brought to ER by ambulance.  Patient states that she currently feels fine, no weakness or dizziness/ lightheadedness, denies any loss of sensation or weakness in any of the extremities.  Patient states that she had R wrist surgery, and therefore has some mild weakness however right now is not different from baseline.  Denies chest pain, palpitations, SOB, cough, abdominal pain, not currently nauseous, diarrhea/ constipation, weakness or loss of sensation, no sick contacts or COVID exposure.    In the ED, patient's vitals were: T97.7F, HR 64, /76, RR 18 and SpO2 96% on RA. Significant labs include: H/H 10.1/31.4, AST 64 and ALT 59. Trop .247.   CT Head: Acute right parietal lobe infarct.   CTA: No large vessel occlusion.  CTA head: Trace right cavernous carotid artery calcifications.  The [place] and vertebrobasilar system are unremarkable without evidence of stenosis, occlusion or saccular aneurysm dilation. No evidence for arterial venous malformation. The vertebral arteries are codominant.  CTA NECK: Trace left carotid bulb calcifications. A left-sided aortic arch is demonstrated. There is normal relationship to the great vessels. The common carotid arteries, internal carotid arteries and vertebral arteries are normal in caliber. No evidence of stenosis, occlusion or saccular aneurysm dilation. The vertebral arteries are codominant.  CTA abdomen/ pelvis with IV contrast: Multiple left rib fractures involving the 7th -11th ribs. Fracture of the left posterior 10th rib is comminuted and displaced. Focal dilatation of the main pancreatic duct in the head measuring9 mm. Indeterminant 1.5 cm left adrenal nodule. Follow-up nonemergent MRI is recommended. Abnormal thickening of the endometrium. Correlate with nonemergent pelvic ultrasound.  EKG: pending  Pt given 1L normal saline, asa 325mg PO and Zofran 4mg IV x1. (20 Mar 2020 18:42)      PAST MEDICAL & SURGICAL HISTORY:  Fracture of arm: right distal radius  Essential hypertension  Ulnar fracture: s/p fixation R ulnar      SOCIAL HISTORY: no tob, etoh    FAMILY HISTORY:   No pertinent family history in first degree relatives      MEDICATIONS  (STANDING):  aspirin enteric coated 325 milliGRAM(s) Oral daily  atorvastatin 40 milliGRAM(s) Oral at bedtime  enoxaparin Injectable 40 milliGRAM(s) SubCutaneous daily  levothyroxine 25 MICROGram(s) Oral daily    Allergies    No Known Allergies    REVIEW OF SYSTEMS: unobtainable    VITAL SIGNS:   Vital Signs Last 24 Hrs  T(C): 37.2 (21 Mar 2020 07:41), Max: 37.6 (20 Mar 2020 21:23)  T(F): 98.9 (21 Mar 2020 07:41), Max: 99.6 (20 Mar 2020 21:23)  HR: 66 (21 Mar 2020 07:41) (59 - 70)  BP: 145/75 (21 Mar 2020 07:41) (127/67 - 159/76)  BP(mean): --  RR: 19 (21 Mar 2020 07:41) (17 - 19)  SpO2: 91% (21 Mar 2020 07:41) (90% - 97%)    I&O's Summary    20 Mar 2020 07:01  -  21 Mar 2020 07:00  --------------------------------------------------------  IN: 0 mL / OUT: 200 mL / NET: -200 mL        PHYSICAL EXAM:    Constitutional: NAD, awake and alert  Eyes:  Pupils round, no lesions  ENMT: no exudate or erythema  Pulmonary:  breath sounds are clear bilaterally, No wheezing, rales or rhonchi  Cardiovascular: PMI not palpable  Regular S1 and S2, no murmurs, rubs, gallops or clicks  Gastrointestinal: Bowel Sounds present, soft, nontender.   Lymph: No peripheral edema. No cervical lymphadenopathy.  Neurological: Alert, per neuro  Skin: No rashes. . No cyanosis.  Psych: cannot assess    LABS: All Labs Reviewed:                        9.7    8.76  )-----------( 223      ( 21 Mar 2020 07:48 )             30.4                         10.1   8.70  )-----------( 241      ( 20 Mar 2020 16:28 )             31.4     21 Mar 2020 07:48    138    |  109    |  20     ----------------------------<  86     3.9     |  21     |  1.10   20 Mar 2020 16:28    138    |  108    |  17     ----------------------------<  123    4.0     |  24     |  1.10     Ca    9.3        21 Mar 2020 07:48  Ca    9.3        20 Mar 2020 16:28    TPro  10.0   /  Alb  2.8    /  TBili  0.3    /  DBili  x      /  AST  64     /  ALT  59     /  AlkPhos  45     20 Mar 2020 16:28    PT/INR - ( 20 Mar 2020 16:28 )   PT: 13.6 sec;   INR: 1.21 ratio           CARDIAC MARKERS ( 21 Mar 2020 07:11 )  .425 ng/mL / x     / 93 U/L / x     / 2.0 ng/mL  CARDIAC MARKERS ( 20 Mar 2020 23:40 )  .378 ng/mL / x     / 97 U/L / x     / 2.7 ng/mL  CARDIAC MARKERS ( 20 Mar 2020 16:28 )  .247 ng/mL / x     / 104 U/L / x     / 3.0 ng/mL      ECG: SR, nom spec T abn      < from: Xray Chest 1 View- PORTABLE-Urgent (03.20.20 @ 17:15) >    EXAM:  XR CHEST PORTABLE URGENT 1V                            PROCEDURE DATE:  03/20/2020          INTERPRETATION:  Chest one view    HISTORY: Weakness    COMPARISON STUDY: None available    Frontal expiratory view of the chest shows the heart to be borderline in size. The lungs show questionable atelectasis or vascular calcification over the heart and there is no evidence of pneumothorax nor pleural effusion.    IMPRESSION:  Questionable left base density. PA and lateral study is recommended when clinically feasible.    Thank you for the courtesy of this referral.                AMALIA ROJAS M.D., ATTENDING RADIOLOGIST  This document has been electronically signed. Mar 20 2020  5:36PM                < end of copied text >  < from: Xray Chest 1 View- PORTABLE-Urgent (03.20.20 @ 17:15) >    EXAM:  XR CHEST PORTABLE URGENT 1V                            PROCEDURE DATE:  03/20/2020          INTERPRETATION:  Chest one view    HISTORY: Weakness    COMPARISON STUDY: None available    Frontal expiratory view of the chest shows the heart to be borderline in size. The lungs show questionable atelectasis or vascular calcification over the heart and there is no evidence of pneumothorax nor pleural effusion.    IMPRESSION:  Questionable left base density. PA and lateral study is recommended when clinically feasible.    Thank you for the courtesy of this referral.                AMALIA ROJAS M.D., ATTENDING RADIOLOGIST  This document has been electronically signed. Mar 20 2020  5:36PM                < end of copied text >    < from: TTE Echo Complete w/o contrast w/ Doppler (03.20.20 @ 20:29) >     EXAM:  ECHO TTE WO CON COMP W DOPP         PROCEDURE DATE:  03/20/2020        INTERPRETATION:  INDICATION: Syncope and collapse  Sonographer AS    Blood Pressure 148/65    Height 165 cm     Weight 68.5 kg       BSA 1.76 sq m    Dimensions:    LA 3.4       Normal Values: 2.0 - 4.0 cm    Ao 2.5        Normal Values: 2.0 - 3.8 cm  SEPTUM 1.3       Normal Values: 0.6 - 1.2 cm  PWT 1.2       Normal Values: 0.6 - 1.1 cm  LVIDd 4.8         Normal Values: 3.0 - 5.6 cm  LVIDs 3.3         Normal Values:1.8 - 4.0 cm      OBSERVATIONS:  Technically difficult study  Mitral Valve: Thickened leaflets, moderate MR.  Aortic Valve/Aorta: normal trileaflet aortic valve.  Tricuspid Valve: normal with mild TR.  Pulmonic Valve: normal  Left Atrium: normal  Right Atrium: Not well-visualized  Left Ventricle: normal LV size and systolic function, estimated LVEF of 55-60%. LVH  Right Ventricle: Grossly normal size and systolic function.  Pericardium/Pleura: normal, small pericardial effusion without hemodynamic compromise.  Pulmonary/RV Pressure: estimated PA systolic pressure of 35 mmHg assuming an RA pressure of 8 mmHg.  LV diastolic dysfunction is present    Conclusion:   Technically difficult study  Mild concentric LVH with normal internal dimensions and systolic function, estimated LVEF of 55-60%.   Grossly normal RV size and systolic function.    Normal trileaflet aortic valve, without AI.   Moderate MR  Mild TR.    Estimated PA systolic pressure of 35 mmHg. The IVC is dilated  Small pericardial effusion without hemodynamic compromise.                  TRINITY NICOLAS   This document has been electronically signed. Mar 21 2020 11:05AM                < end of copied text >

## 2020-03-21 NOTE — PROGRESS NOTE ADULT - SUBJECTIVE AND OBJECTIVE BOX
Patient is a 80y old  Female who presents with a chief complaint of acute CVA.      INTERVAL HPI/OVERNIGHT EVENTS: Pt denies weakness. Denies chest pain (or rib pain), SOB, fever, chills, vision changes, palpitations.    MEDICATIONS  (STANDING):  aspirin enteric coated 325 milliGRAM(s) Oral daily  atorvastatin 40 milliGRAM(s) Oral at bedtime  enoxaparin Injectable 40 milliGRAM(s) SubCutaneous daily  levothyroxine 25 MICROGram(s) Oral daily    MEDICATIONS  (PRN):      Allergies    No Known Allergies    Intolerances        REVIEW OF SYSTEMS:  CONSTITUTIONAL: No fever or chills  HEENT:  No headache, no sore throat  RESPIRATORY: No cough, wheezing, or shortness of breath  CARDIOVASCULAR: No chest pain, palpitations  GASTROINTESTINAL: No abd pain, nausea, vomiting, or diarrhea  GENITOURINARY: No dysuria, frequency, or hematuria  NEUROLOGICAL: denies weakness; no dizziness  MUSCULOSKELETAL: no myalgias     Tele: has been in SR on tele  Vital Signs Last 24 Hrs  T(C): 37.2 (21 Mar 2020 07:41), Max: 37.6 (20 Mar 2020 21:23)  T(F): 98.9 (21 Mar 2020 07:41), Max: 99.6 (20 Mar 2020 21:23)  HR: 66 (21 Mar 2020 07:41) (59 - 70)  BP: 145/75 (21 Mar 2020 07:41) (127/67 - 159/76)  BP(mean): --  RR: 19 (21 Mar 2020 07:41) (17 - 19)  SpO2: 91% (21 Mar 2020 07:41) (90% - 97%)    PHYSICAL EXAM:  GENERAL: No acute distress at rest  HEENT:  anicteric, moist mucous membranes  CHEST/LUNG:  CTA b/l, no rales, wheezes, or rhonchi  HEART:  RRR, S1, S2  ABDOMEN:  BS+, soft, nontender, nondistended  EXTREMITIES: no edema, cyanosis, or calf tenderness  NERVOUS SYSTEM: AA&Ox3 (knows exact date even), CN intact, appears to have 2/5 strength in L UE though difficult to gauge because she has left-sided neglect and may having difficulty following commands for that reason, 4/5 in b/l LEs and R UE. sensation to light touch intact    LABS:                        9.7    8.76  )-----------( 223      ( 21 Mar 2020 07:48 )             30.4     CBC Full  -  ( 21 Mar 2020 07:48 )  WBC Count : 8.76 K/uL  Hemoglobin : 9.7 g/dL  Hematocrit : 30.4 %  Platelet Count - Automated : 223 K/uL  Mean Cell Volume : 95.3 fl  Mean Cell Hemoglobin : 30.4 pg  Mean Cell Hemoglobin Concentration : 31.9 gm/dL  Auto Neutrophil # : x  Auto Lymphocyte # : x  Auto Monocyte # : x  Auto Eosinophil # : x  Auto Basophil # : x  Auto Neutrophil % : x  Auto Lymphocyte % : x  Auto Monocyte % : x  Auto Eosinophil % : x  Auto Basophil % : x    21 Mar 2020 07:48    138    |  109    |  20     ----------------------------<  86     3.9     |  21     |  1.10     Ca    9.3        21 Mar 2020 07:48      PT/INR - ( 20 Mar 2020 16:28 )   PT: 13.6 sec;   INR: 1.21 ratio             CAPILLARY BLOOD GLUCOSE              RADIOLOGY & ADDITIONAL TESTS:    Personally reviewed.     Consultant(s) Notes Reviewed:  [x] YES  [ ] NO Patient is a 80y old  Female who presents with a chief complaint of acute CVA.    INTERVAL HPI/OVERNIGHT EVENTS: Pt denies weakness. Denies chest pain (or rib pain), SOB, fever, chills, vision changes, palpitations.    MEDICATIONS  (STANDING):  aspirin enteric coated 325 milliGRAM(s) Oral daily  atorvastatin 40 milliGRAM(s) Oral at bedtime  enoxaparin Injectable 40 milliGRAM(s) SubCutaneous daily  levothyroxine 25 MICROGram(s) Oral daily    MEDICATIONS  (PRN):      Allergies    No Known Allergies    Intolerances        REVIEW OF SYSTEMS:  CONSTITUTIONAL: No fever or chills  HEENT:  No headache, no sore throat  RESPIRATORY: No cough, wheezing, or shortness of breath  CARDIOVASCULAR: No chest pain, palpitations  GASTROINTESTINAL: No abd pain, nausea, vomiting, or diarrhea  GENITOURINARY: No dysuria, frequency, or hematuria  NEUROLOGICAL: denies weakness; no dizziness  MUSCULOSKELETAL: no myalgias     Tele: has been in SR on tele  Vital Signs Last 24 Hrs  T(C): 37.2 (21 Mar 2020 07:41), Max: 37.6 (20 Mar 2020 21:23)  T(F): 98.9 (21 Mar 2020 07:41), Max: 99.6 (20 Mar 2020 21:23)  HR: 66 (21 Mar 2020 07:41) (59 - 70)  BP: 145/75 (21 Mar 2020 07:41) (127/67 - 159/76)  BP(mean): --  RR: 19 (21 Mar 2020 07:41) (17 - 19)  SpO2: 91% (21 Mar 2020 07:41) (90% - 97%)    PHYSICAL EXAM:  GENERAL: No acute distress at rest  HEENT:  anicteric, moist mucous membranes  CHEST/LUNG:  CTA b/l, no rales, wheezes, or rhonchi  HEART:  RRR, S1, S2  ABDOMEN:  BS+, soft, nontender, nondistended  EXTREMITIES: no edema, cyanosis, or calf tenderness  NERVOUS SYSTEM: AA&Ox3 (knows exact date even), CN intact, appears to have 2/5 strength in L UE though difficult to gauge because she has left-sided neglect and may having difficulty following commands for that reason, 4/5 in b/l LEs and R UE. sensation to light touch intact    LABS:                        9.7    8.76  )-----------( 223      ( 21 Mar 2020 07:48 )             30.4     CBC Full  -  ( 21 Mar 2020 07:48 )  WBC Count : 8.76 K/uL  Hemoglobin : 9.7 g/dL  Hematocrit : 30.4 %  Platelet Count - Automated : 223 K/uL  Mean Cell Volume : 95.3 fl  Mean Cell Hemoglobin : 30.4 pg  Mean Cell Hemoglobin Concentration : 31.9 gm/dL  Auto Neutrophil # : x  Auto Lymphocyte # : x  Auto Monocyte # : x  Auto Eosinophil # : x  Auto Basophil # : x  Auto Neutrophil % : x  Auto Lymphocyte % : x  Auto Monocyte % : x  Auto Eosinophil % : x  Auto Basophil % : x    21 Mar 2020 07:48    138    |  109    |  20     ----------------------------<  86     3.9     |  21     |  1.10     Ca    9.3        21 Mar 2020 07:48      PT/INR - ( 20 Mar 2020 16:28 )   PT: 13.6 sec;   INR: 1.21 ratio             CAPILLARY BLOOD GLUCOSE              RADIOLOGY & ADDITIONAL TESTS:    Personally reviewed.     Consultant(s) Notes Reviewed:  [x] YES  [ ] NO

## 2020-03-21 NOTE — SWALLOW BEDSIDE ASSESSMENT ADULT - ORAL PHASE
Reduced attention to bolus, suspect posterior loss/Delayed oral transit time Delayed oral transit time/Reduced attention to bolus, suspect posterior loss

## 2020-03-21 NOTE — DISCHARGE NOTE PROVIDER - CARE PROVIDER_API CALL
Kam Walsh)  Internal Medicine  [address] [place] [address]  Phone: [phone]  Fax: [phone]  Follow Up Time:     Kirstin Marie)  Neurology  [address] [place] [address]  Phone: [phone]  Fax: [phone]  Follow Up Time:     Katherin Escobar)  Hematology; Medical Oncology  [address] [facility], [address]  [place] [address]  Phone: [phone]  Fax: [phone]  Follow Up Time:

## 2020-03-21 NOTE — PROGRESS NOTE ADULT - ASSESSMENT
79yo F w/ PMH of HTN and hypothyroidism presenting with generalized weakness, difficulty walking and dizziness admitted for acute right parietal lobe infarct.

## 2020-03-22 LAB
ALBUMIN SERPL ELPH-MCNC: 2.4 G/DL — LOW (ref 3.3–5)
ALP SERPL-CCNC: 34 U/L — LOW (ref 40–120)
ALT FLD-CCNC: 38 U/L — SIGNIFICANT CHANGE UP (ref 12–78)
ANION GAP SERPL CALC-SCNC: 8 MMOL/L — SIGNIFICANT CHANGE UP (ref 5–17)
AST SERPL-CCNC: 36 U/L — SIGNIFICANT CHANGE UP (ref 15–37)
BILIRUB DIRECT SERPL-MCNC: <.1 MG/DL — SIGNIFICANT CHANGE UP (ref 0.05–0.2)
BILIRUB INDIRECT FLD-MCNC: >0.2 MG/DL — SIGNIFICANT CHANGE UP (ref 0.2–1)
BILIRUB SERPL-MCNC: 0.3 MG/DL — SIGNIFICANT CHANGE UP (ref 0.2–1.2)
BUN SERPL-MCNC: 22 MG/DL — SIGNIFICANT CHANGE UP (ref 7–23)
CALCIUM SERPL-MCNC: 9 MG/DL — SIGNIFICANT CHANGE UP (ref 8.5–10.1)
CHLORIDE SERPL-SCNC: 109 MMOL/L — HIGH (ref 96–108)
CHOLEST SERPL-MCNC: 148 MG/DL — SIGNIFICANT CHANGE UP (ref 10–199)
CO2 SERPL-SCNC: 23 MMOL/L — SIGNIFICANT CHANGE UP (ref 22–31)
CREAT SERPL-MCNC: 1.1 MG/DL — SIGNIFICANT CHANGE UP (ref 0.5–1.3)
FERRITIN SERPL-MCNC: 232 NG/ML — HIGH (ref 15–150)
FOLATE SERPL-MCNC: 7.3 NG/ML — SIGNIFICANT CHANGE UP
GLUCOSE SERPL-MCNC: 96 MG/DL — SIGNIFICANT CHANGE UP (ref 70–99)
HBA1C BLD-MCNC: 5.8 % — HIGH (ref 4–5.6)
HCT VFR BLD CALC: 28.9 % — LOW (ref 34.5–45)
HDLC SERPL-MCNC: 45 MG/DL — LOW
HGB BLD-MCNC: 9.5 G/DL — LOW (ref 11.5–15.5)
IRON SATN MFR SERPL: 26 UG/DL — LOW (ref 30–160)
IRON SATN MFR SERPL: SIGNIFICANT CHANGE UP % (ref 14–50)
LIPID PNL WITH DIRECT LDL SERPL: 79 MG/DL — SIGNIFICANT CHANGE UP
MAGNESIUM SERPL-MCNC: 1.8 MG/DL — SIGNIFICANT CHANGE UP (ref 1.6–2.6)
MCHC RBC-ENTMCNC: 30.6 PG — SIGNIFICANT CHANGE UP (ref 27–34)
MCHC RBC-ENTMCNC: 32.9 GM/DL — SIGNIFICANT CHANGE UP (ref 32–36)
MCV RBC AUTO: 93.2 FL — SIGNIFICANT CHANGE UP (ref 80–100)
NRBC # BLD: 0 /100 WBCS — SIGNIFICANT CHANGE UP (ref 0–0)
PLATELET # BLD AUTO: 205 K/UL — SIGNIFICANT CHANGE UP (ref 150–400)
POTASSIUM SERPL-MCNC: 3.9 MMOL/L — SIGNIFICANT CHANGE UP (ref 3.5–5.3)
POTASSIUM SERPL-SCNC: 3.9 MMOL/L — SIGNIFICANT CHANGE UP (ref 3.5–5.3)
PROT SERPL-MCNC: 9.5 G/DL — HIGH (ref 6–8.3)
RBC # BLD: 3.1 M/UL — LOW (ref 3.8–5.2)
RBC # FLD: 13.5 % — SIGNIFICANT CHANGE UP (ref 10.3–14.5)
SODIUM SERPL-SCNC: 140 MMOL/L — SIGNIFICANT CHANGE UP (ref 135–145)
TIBC SERPL-MCNC: SIGNIFICANT CHANGE UP UG/DL (ref 220–430)
TOTAL CHOLESTEROL/HDL RATIO MEASUREMENT: 3.3 RATIO — SIGNIFICANT CHANGE UP (ref 3.3–7.1)
TRIGL SERPL-MCNC: 118 MG/DL — SIGNIFICANT CHANGE UP (ref 10–149)
TSH SERPL-MCNC: 2.8 UIU/ML — SIGNIFICANT CHANGE UP (ref 0.36–3.74)
UIBC SERPL-MCNC: <20 UG/DL — LOW (ref 110–370)
VIT B12 SERPL-MCNC: 1101 PG/ML — SIGNIFICANT CHANGE UP (ref 232–1245)
WBC # BLD: 9.02 K/UL — SIGNIFICANT CHANGE UP (ref 3.8–10.5)
WBC # FLD AUTO: 9.02 K/UL — SIGNIFICANT CHANGE UP (ref 3.8–10.5)

## 2020-03-22 PROCEDURE — 99233 SBSQ HOSP IP/OBS HIGH 50: CPT

## 2020-03-22 PROCEDURE — 99232 SBSQ HOSP IP/OBS MODERATE 35: CPT

## 2020-03-22 RX ORDER — SODIUM CHLORIDE 9 MG/ML
1000 INJECTION INTRAMUSCULAR; INTRAVENOUS; SUBCUTANEOUS
Refills: 0 | Status: DISCONTINUED | OUTPATIENT
Start: 2020-03-22 | End: 2020-03-24

## 2020-03-22 RX ADMIN — SODIUM CHLORIDE 50 MILLILITER(S): 9 INJECTION INTRAMUSCULAR; INTRAVENOUS; SUBCUTANEOUS at 09:49

## 2020-03-22 RX ADMIN — Medication 25 MICROGRAM(S): at 06:08

## 2020-03-22 RX ADMIN — ENOXAPARIN SODIUM 40 MILLIGRAM(S): 100 INJECTION SUBCUTANEOUS at 11:38

## 2020-03-22 RX ADMIN — Medication 325 MILLIGRAM(S): at 11:40

## 2020-03-22 RX ADMIN — ATORVASTATIN CALCIUM 40 MILLIGRAM(S): 80 TABLET, FILM COATED ORAL at 21:26

## 2020-03-22 NOTE — PROGRESS NOTE ADULT - ASSESSMENT
Patient is admitted with an acute right hemispheric stroke currently undergoing neurology evaluation. The etiology of her stroke remains unclear with no clear carotid stenosis and the possibility of a cardioembolic phenomenon. Preliminary echocardiography does not reveal any evidence of a cardioembolic cause for her event. Her troponin is elevated but does not reveal a curve consistent with acute atherothrombosis. This is of no clinical significance at present. She remains hemodynamically stable currently in sinus rhythm    Recommend    CVA  - MR Brain - Acute right MCA territorial infarct with no acute ICH  - Cont tele to r/o arrythmia as for cause   - No events on tele overnight  - Consider Implantable loop recorder in future for long-term monitoring for arrhythmogenic cause  - May consider transesophageal echocardiography with bubble study to r/o PFO for possible cause in the future  - Blood pressure control with permissive hypertension, systolic 150-190 with diastolic <105  - Continue statin  - Cont ASA as per neurology    Elevated trops  - Not consistent with plaque rupture no need to continue trend  - TTE - mild concentric LVH with normal Systolic function, LVEF 55-60%; Moderate MR, mild TR, small pericardial effusion    HTN  - Not on any antihypertensives.  - SBP trend 120s-150s   - Allow for permissive HTN treat for SBP >190 and/or diastolic >105    Anemia  - Hgb trending down  - Monitor closely no s/s of bleeding  - Workup as per primary team    Low grade fever  - No leukocytosis  - I/S as tolerated    DVT PPX  - Lovenox as per primary team    Monitor Electrolytes and replete for K+ >4 and Mag >2  All other medical needs as per primary team  Further management can be dependent on her clinical course.  Will follow    ALMA Colvin-BC  Cardiology Patient is admitted with an acute right hemispheric stroke currently undergoing neurology evaluation. The etiology of her stroke remains unclear with no clear carotid stenosis and the possibility of a cardioembolic phenomenon. Preliminary echocardiography does not reveal any evidence of a cardioembolic cause for her event. Her troponin is elevated but does not reveal a curve consistent with acute atherothrombosis. This is of no clinical significance at present. She remains hemodynamically stable currently in sinus rhythm    Recommend    CVA  - MR Brain - Acute right MCA territorial infarct with no acute ICH  - Cont tele to r/o arrythmia as for cause SEE ADDENDUM FOR NEW ONSET ARRHYTHMIA   - No events on tele overnight  - Consider Implantable loop recorder in future for long-term monitoring for arrhythmogenic cause  - May consider transesophageal echocardiography with bubble study to r/o PFO for possible cause in the future  - Blood pressure control with permissive hypertension, systolic 150-190 with diastolic <105  - Continue statin  - Cont ASA as per neurology    Elevated trops  - Not consistent with plaque rupture no need to continue trend  - TTE - mild concentric LVH with normal Systolic function, LVEF 55-60%; Moderate MR, mild TR, small pericardial effusion    HTN  - Not on any antihypertensives.  - SBP trend 120s-150s   - Allow for permissive HTN treat for SBP >190 and/or diastolic >105    Anemia  - Hgb trending down  - Monitor closely no s/s of bleeding  - Workup as per primary team    Low grade fever  - No leukocytosis  - I/S as tolerated    DVT PPX  - Lovenox as per primary team    Monitor Electrolytes and replete for K+ >4 and Mag >2  All other medical needs as per primary team  Further management can be dependent on her clinical course.  Will follow    Addendum:  Pt with 2 paroxysmal tele events resembling paroxysmal runs of AF with RVR at 11:03 and 11:05.  Will probably need to be started on DOAC given acute CVA.  Consider started when cleared by Neuro.    Becky Nguyen, ALMA-BC  Cardiology

## 2020-03-22 NOTE — PROGRESS NOTE ADULT - ASSESSMENT
81yo F w/ PMH of HTN and hypothyroidism presenting with generalized weakness, difficulty walking and dizziness admitted for acute right parietal lobe infarct, now with evidence for paroxysmal afib on telemetry.

## 2020-03-22 NOTE — PROGRESS NOTE ADULT - PROBLEM SELECTOR PLAN 2
-patient noted to have elevated trop on admission, likely 2/2 demand ischemia in setting of acute CVA  -normal CK and slight upward of troponin not consistent with plaque rupture ACS pattern   -TTE showing normal systolic LV function, LVEF of 55-60%, +diastolic dysfunction, moderate MR   -cardio Dr. Randle group -- recs appreciated  -outpt cardio f/up in the future can consider stress testing

## 2020-03-22 NOTE — CONSULT NOTE ADULT - SUBJECTIVE AND OBJECTIVE BOX
Date/Time Patient Seen:  		  Referring MD:   Data Reviewed	       Patient is a 80y old  Female who presents with a chief complaint of acute CVA (22 Mar 2020 10:41)      Subjective/HPI  in bed  seen and examined  vs and meds reviewed  labs reviewed  h and p reviewed  er provider note reviewed  alert  verbal  ct scan reviewed  tte reviewed  mri brain reviewed    History of Present Illness:  Reason for Admission: acute CVA  History of Present Illness:   80 year old F PMH HTN and hypothyroidism presenting with generalized weakness, difficulty walking and dizziness.  Patient states that she was feeling fine yesterday however today felt like she had a frontal headache.  Patient at breakfast this morning and was able to keep it down, however had headache and took tylenol around 11:30am however felt nauseous and went to the bathroom.  She threw up tylenol and then felt a bit lightheaded and was walking towards bedroom when she fell in hallway.  Patient denies palpitations or diaphoresis prior to fall, denies feeling dizzy and states her R leg gave out.  Patient denies head trauma or LOC and was awake during the whole episode, her daughter was called who tried to get her up however she was unable to help get patient up (patient also felt week and was not able to get herself up).  Patient was then brought to ER by ambulance.  Patient states that she currently feels fine, no weakness or dizziness/ lightheadedness, denies any loss of sensation or weakness in any of the extremities.  Patient states that she had R wrist surgery, and therefore has some mild weakness however right now is not different from baseline.  Denies chest pain, palpitations, SOB, cough, abdominal pain, not currently nauseous, diarrhea/ constipation, weakness or loss of sensation, no sick contacts or COVID exposure.    In the ED, patient's vitals were: T97.7F, HR 64, /76, RR 18 and SpO2 96% on RA. Significant labs include: H/H 10.1/31.4, AST 64 and ALT 59. Trop .247.   CT Head: Acute right parietal lobe infarct.   CTA: No large vessel occlusion.  CTA head: Trace right cavernous carotid artery calcifications.  The [place] and vertebrobasilar system are unremarkable without evidence of stenosis, occlusion or saccular aneurysm dilation. No evidence for arterial venous malformation. The vertebral arteries are codominant.  CTA NECK: Trace left carotid bulb calcifications. A left-sided aortic arch is demonstrated. There is normal relationship to the great vessels. The common carotid arteries, internal carotid arteries and vertebral arteries are normal in caliber. No evidence of stenosis, occlusion or saccular aneurysm dilation. The vertebral arteries are codominant.  CTA abdomen/ pelvis with IV contrast: Multiple left rib fractures involving the 7th -11th ribs. Fracture of the left posterior 10th rib is comminuted and displaced. Focal dilatation of the main pancreatic duct in the head measuring9 mm. Indeterminant 1.5 cm left adrenal nodule. Follow-up nonemergent MRI is recommended. Abnormal thickening of the endometrium. Correlate with nonemergent pelvic ultrasound.     PAST MEDICAL & SURGICAL HISTORY:  Fracture of arm: right distal radius  Essential hypertension  Ulnar fracture: s/p fixation R ulnar  No significant past surgical history        Medication list         MEDICATIONS  (STANDING):  aspirin enteric coated 325 milliGRAM(s) Oral daily  atorvastatin 40 milliGRAM(s) Oral at bedtime  enoxaparin Injectable 40 milliGRAM(s) SubCutaneous daily  levothyroxine 25 MICROGram(s) Oral daily  sodium chloride 0.9%. 1000 milliLiter(s) (50 mL/Hr) IV Continuous <Continuous>    MEDICATIONS  (PRN):         Vitals log        ICU Vital Signs Last 24 Hrs  T(C): 36.7 (22 Mar 2020 16:21), Max: 37.8 (21 Mar 2020 23:54)  T(F): 98.1 (22 Mar 2020 16:21), Max: 100.1 (21 Mar 2020 23:54)  HR: 71 (22 Mar 2020 16:21) (67 - 89)  BP: 138/69 (22 Mar 2020 16:21) (123/71 - 152/87)  BP(mean): --  ABP: --  ABP(mean): --  RR: 18 (22 Mar 2020 16:21) (18 - 18)  SpO2: 93% (22 Mar 2020 16:21) (91% - 93%)     PAST SURGICAL HISTORY:  Ulnar fracture s/p fixation R ulnar.     No pertinent family history in first degree relatives.     No Pertinent Family History in first degree relatives of: CAD.     Social History:  Social History (marital status, living situation, occupation, tobacco use, alcohol and drug use, and sexual history): patient ambulates independently and able to perform ADLs on her own  	lives at home with   denies smoking history, EtOH, or illicit drug use     Tobacco Screening:  · Core Measure Site	Yes  · Has the patient used tobacco in the past 30 days?	No    Risk Assessment:    Present on Admission:  Deep Venous Thrombosis	no  Pulmonary Embolus	no     Heart Failure:  Does this patient have a history of or has been diagnosed with heart failure? no.      Input and Output:  I&O's Detail      Lab Data                        9.5    9.02  )-----------( 205      ( 22 Mar 2020 07:40 )             28.9     03-22    140  |  109<H>  |  22  ----------------------------<  96  3.9   |  23  |  1.10    Ca    9.0      22 Mar 2020 07:40  Mg     1.8     03-22    TPro  9.5<H>  /  Alb  2.4<L>  /  TBili  0.3  /  DBili  <.10  /  AST  36  /  ALT  38  /  AlkPhos  34<L>  03-22      CARDIAC MARKERS ( 21 Mar 2020 07:11 )  .425 ng/mL / x     / 93 U/L / x     / 2.0 ng/mL  CARDIAC MARKERS ( 20 Mar 2020 23:40 )  .378 ng/mL / x     / 97 U/L / x     / 2.7 ng/mL        Review of Systems	  on room air  right side weak    Objective     Physical Examination    heart s1s2  lung dec BS  abd soft  head nc  verbal  alert      Pertinent Lab findings & Imaging      Mcnamara:  NO   Adequate UO     I&O's Detail           Discussed with:     Cultures:	        Radiology      EXAM:  MR BRAIN                            PROCEDURE DATE:  03/21/2020          INTERPRETATION:  CLINICAL STATEMENT: Right MCA infarct    TECHNIQUE: MRI of the brain was performed without gadolinium.    COMPARISON: CT head 3/20/2020    FINDINGS:  There is moderate diffuse parenchymal volume loss. There are T2 prolongation signal abnormalities in the brainstem, periventricular subcortical white matter likely related to moderate to severe chronic microvascular ischemic changes.    Large acute right MCA territorial infarct largest area measuring 10.7 x 3.3 cm.    There is no acute parenchymal hemorrhage, or midline shift. There is no extra-axial fluid collection.  There is no hydrocephalus.    Mucosal thickening paranasal sinuses        IMPRESSION:  Acute right MCA territorial infarct.    No acute intracranial hemorrhage                AYSE NORTH M.D., ATTENDING RADIOLOGIST  This document has been electronically signed. Mar 21 2020 11:40AM            EXAM:  XR CHEST PORTABLE URGENT 1V                            PROCEDURE DATE:  03/20/2020          INTERPRETATION:  Chest one view    HISTORY: Weakness    COMPARISON STUDY: None available    Frontal expiratory view of the chest shows the heart to be borderline in size. The lungs show questionable atelectasis or vascular calcification over the heart and there is no evidence of pneumothorax nor pleural effusion.    IMPRESSION:  Questionable left base density. PA and lateral study is recommended when clinically feasible.    Thank you for the courtesy of this referral.                AMALIA ROJAS M.D., ATTENDING RADIOLOGIST  This document has been electronically signed. Mar 20 2020  5:36PM                EXAM:  CT ABDOMEN AND PELVIS IC                            PROCEDURE DATE:  03/20/2020          INTERPRETATION:  CLINICAL INFORMATION: Vomiting, rule out obstruction    COMPARISON: None.    PROCEDURE:   CT of the Abdomen and Pelvis was performed with intravenous contrast.   Intravenous contrast: 95 ml Omnipaque 350. 5 ml discarded.  Oral contrast: None.  Sagittal and coronal reformats were performed.    FINDINGS:    LOWER CHEST: Mildly displaced fractures of the left 7th-9th ribs. Comminuted, displaced fracture of the left posterior 10th rib. There is a nondisplaced fracture of the left posterior 11th rib. Small bibasilar atelectasis, left greater than right.    LIVER: Within normal limits.  BILE DUCTS: Normal caliber.  GALLBLADDER: Within normal limits.  SPLEEN: Within normal limits.  PANCREAS: Focal dilatation of the main pancreatic duct in the region of the head measuring up to 9 mm.  ADRENALS: 1.5 cm indeterminate left adrenal nodule.  KIDNEYS/URETERS: Bilateral subcentimeter renal hypodensities, too small to characterize.    BLADDER: Underdistended  REPRODUCTIVE ORGANS: Thickening of the endometrium which measures 9 mm.    BOWEL: No bowel obstruction. Extensive colonic diverticulosis. Appendix is normal.  PERITONEUM: Mild atherosclerotic changes of the aorta.  VESSELS: Within normal limits.  RETROPERITONEUM/LYMPH NODES: No lymphadenopathy.    ABDOMINAL WALL: Within normal limits.  BONES: Multilevel degenerative changes of the spine.    IMPRESSION:     Multiple left rib fractures involving the 7th -11th ribs. Fracture of the left posterior 10th rib is comminuted and displaced.    Focal dilatation of the main pancreatic duct in the head measuring9 mm. Indeterminant 1.5 cm left adrenal nodule. Follow-up nonemergent MRI is recommended.    Abnormal thickening of the endometrium. Correlate with nonemergent pelvic ultrasound.    Additional findings as above.    Above findings were discussed with Dr. Lake on 3/20/2020 6:39 PM.                 DAYANNA LUCERO M.D., ATTENDING RADIOLOGIST  This document has been electronically signed. Mar 20 2020  6:43PM

## 2020-03-22 NOTE — CONSULT NOTE ADULT - PROBLEM SELECTOR RECOMMENDATION 9
left rib fx - multiple - needs to use I sameer - communicated to pt and rn  HOB elev  asp prec  dysphagia  CVA - mri brain reviewed, TTE reviewed - ct abd reviewed  supportive measures  cvs rx regimen and BP control  on tele monitor   cardio and neuro follow up noted  discussed plan of care with the patient  will need LINDA on DC likely

## 2020-03-22 NOTE — PROGRESS NOTE ADULT - PROBLEM SELECTOR PLAN 6
-patient noted to have focal dilatation of the main pancreatic duct in the head measuring 9 mm. Indeterminant 1.5 cm left adrenal nodule. Follow-up nonemergent MRI is recommended.  To be done outpatient.  -Abnormal thickening of the endometrium. Correlate with nonemergent pelvic ultrasound. To be done outpatient, possible need for endometrial bx as outpt. Discussed these incidental findings with pt and pt's son, Valeriano.

## 2020-03-22 NOTE — PROGRESS NOTE ADULT - SUBJECTIVE AND OBJECTIVE BOX
[facility] -- Mario Jerry, Jer, José Miguel, Nico, Gail Fong  Office # [phone]      Follow Up:  Stroke/Elevated trops    Subjective/Observations: Seen and examined.  Resting comfortably arouses easily lying flat in bed.  No reports of cp, sob or palpitations.  No tele events overnight.  NAD.       REVIEW OF SYSTEMS: All other review of systems is negative unless indicated above    PAST MEDICAL & SURGICAL HISTORY:  Fracture of arm: right distal radius  Essential hypertension  Ulnar fracture: s/p fixation R ulnar      MEDICATIONS  (STANDING):  aspirin enteric coated 325 milliGRAM(s) Oral daily  atorvastatin 40 milliGRAM(s) Oral at bedtime  enoxaparin Injectable 40 milliGRAM(s) SubCutaneous daily  levothyroxine 25 MICROGram(s) Oral daily  sodium chloride 0.9%. 1000 milliLiter(s) (50 mL/Hr) IV Continuous <Continuous>    MEDICATIONS  (PRN):      Allergies    No Known Allergies    Intolerances            Vital Signs Last 24 Hrs  T(C): 36.8 (22 Mar 2020 07:05), Max: 37.8 (21 Mar 2020 23:54)  T(F): 98.3 (22 Mar 2020 07:05), Max: 100.1 (21 Mar 2020 23:54)  HR: 89 (22 Mar 2020 07:05) (58 - 89)  BP: 152/87 (22 Mar 2020 07:05) (146/80 - 154/75)  BP(mean): --  RR: 18 (22 Mar 2020 07:05) (18 - 18)  SpO2: 92% (22 Mar 2020 07:05) (91% - 94%)    I&O's Summary        PHYSICAL EXAM:  TELE: SR 70s with occ PVCs   Constitutional: NAD, arouses easily, well-developed  HEENT: Moist Mucous Membranes, Anicteric, droop noted, speech clear  Pulmonary: Non-labored, breath sounds are clear bilaterally, No wheezing, rales or rhonchi  Cardiovascular: Regular, S1 and S2, No murmurs, rubs, gallops or clicks  Gastrointestinal: Bowel Sounds present, soft, nontender.   Neurovascular: Moves all extremities with  3/5 on left and 5/5 right and 4/5 strength left LE and 5/5 right LE  Lymph: No peripheral edema. No lymphadenopathy.  Skin: No visible rashes or ulcers.  Psych:  Mood & affect appropriate    LABS: All Labs Reviewed:                        9.5    9.02  )-----------( 205      ( 22 Mar 2020 07:40 )             28.9                         9.7    8.76  )-----------( 223      ( 21 Mar 2020 07:48 )             30.4                         10.1   8.70  )-----------( 241      ( 20 Mar 2020 16:28 )             31.4     22 Mar 2020 07:40    140    |  109    |  22     ----------------------------<  96     3.9     |  23     |  1.10   21 Mar 2020 07:48    138    |  109    |  20     ----------------------------<  86     3.9     |  21     |  1.10   20 Mar 2020 16:28    138    |  108    |  17     ----------------------------<  123    4.0     |  24     |  1.10     Ca    9.0        22 Mar 2020 07:40  Ca    9.3        21 Mar 2020 07:48  Ca    9.3        20 Mar 2020 16:28  Mg     1.8       22 Mar 2020 07:40    TPro  9.5    /  Alb  2.4    /  TBili  0.3    /  DBili  <.10   /  AST  36     /  ALT  38     /  AlkPhos  34     22 Mar 2020 07:40  TPro  10.0   /  Alb  2.8    /  TBili  0.3    /  DBili  x      /  AST  64     /  ALT  59     /  AlkPhos  45     20 Mar 2020 16:28    PT/INR - ( 20 Mar 2020 16:28 )   PT: 13.6 sec;   INR: 1.21 ratio           CARDIAC MARKERS ( 21 Mar 2020 07:11 )  .425 ng/mL / x     / 93 U/L / x     / 2.0 ng/mL  CARDIAC MARKERS ( 20 Mar 2020 23:40 )  .378 ng/mL / x     / 97 U/L / x     / 2.7 ng/mL  CARDIAC MARKERS ( 20 Mar 2020 16:28 )  .247 ng/mL / x     / 104 U/L / x     / 3.0 ng/mL    < from: TTE Echo Complete w/o contrast w/ Doppler (03.20.20 @ 20:29) >   EXAM:  ECHO TTE WO CON COMP W DOPP         PROCEDURE DATE:  03/20/2020        INTERPRETATION:  INDICATION: Syncope and collapse  Sonographer AS    Blood Pressure 148/65    Height 165 cm     Weight 68.5 kg       BSA 1.76 sq m    Dimensions:    LA 3.4       Normal Values: 2.0 - 4.0 cm    Ao 2.5        Normal Values: 2.0 - 3.8 cm  SEPTUM 1.3       Normal Values: 0.6 - 1.2 cm  PWT 1.2       Normal Values: 0.6 - 1.1 cm  LVIDd 4.8         Normal Values: 3.0 - 5.6 cm  LVIDs 3.3         Normal Values:1.8 - 4.0 cm      OBSERVATIONS:  Technically difficult study  Mitral Valve: Thickened leaflets, moderate MR.  Aortic Valve/Aorta: normal trileaflet aortic valve.  Tricuspid Valve: normal with mild TR.  Pulmonic Valve: normal  Left Atrium: normal  Right Atrium: Not well-visualized  Left Ventricle: normal LV size and systolic function, estimated LVEF of 55-60%. LVH  Right Ventricle: Grossly normal size and systolic function.  Pericardium/Pleura: normal, small pericardial effusion without hemodynamic compromise.  Pulmonary/RV Pressure: estimated PA systolic pressure of 35 mmHg assuming an RA pressure of 8 mmHg.  LV diastolic dysfunction is present    Conclusion:   Technically difficult study  Mild concentric LVH with normal internal dimensions and systolic function, estimated LVEF of 55-60%.   Grossly normal RV size and systolic function.    Normal trileaflet aortic valve, without AI.   Moderate MR  Mild TR.    Estimated PA systolic pressure of 35 mmHg. The IVC is dilated  Small pericardial effusion without hemodynamic compromise.                  TRINITY NICOLAS   This document has been electronically signed. Mar 21 2020 11:05AM    < end of copied text >    < from: MR Head No Cont (03.21.20 @ 11:30) >  EXAM:  MR BRAIN                            PROCEDURE DATE:  03/21/2020          INTERPRETATION:  CLINICAL STATEMENT: Right MCA infarct    TECHNIQUE: MRI of the brain was performed without gadolinium.    COMPARISON: CT head 3/20/2020    FINDINGS:  There is moderate diffuse parenchymal volume loss. There are T2 prolongation signal abnormalities in the brainstem, periventricular subcortical white matter likely related to moderate to severe chronic microvascular ischemic changes.    Large acute right MCA territorial infarct largest area measuring 10.7 x 3.3 cm.    There is no acute parenchymal hemorrhage, or midline shift. There is no extra-axial fluid collection.  There is no hydrocephalus.    Mucosal thickening paranasal sinuses        IMPRESSION:  Acute right MCA territorial infarct.    No acute intracranial hemorrhage                AYSE NORTH M.D., ATTENDING RADIOLOGIST  This document has been electronically signed. Mar 21 2020 11:40AM                < end of copied text >     < from: CT Abdomen and Pelvis w/ IV Cont (03.20.20 @ 18:08) >  EXAM:  CT ABDOMEN AND PELVIS IC                            PROCEDURE DATE:  03/20/2020          INTERPRETATION:  CLINICAL INFORMATION: Vomiting, rule out obstruction    COMPARISON: None.    PROCEDURE:   CT of the Abdomen and Pelvis was performed with intravenous contrast.   Intravenous contrast: 95 ml Omnipaque 350. 5 ml discarded.  Oral contrast: None.  Sagittal and coronal reformats were performed.    FINDINGS:    LOWER CHEST: Mildly displaced fractures of the left 7th-9th ribs. Comminuted, displaced fracture of the left posterior 10th rib. There is a nondisplaced fracture of the left posterior 11th rib. Small bibasilar atelectasis, left greater than right.    LIVER: Within normal limits.  BILE DUCTS: Normal caliber.  GALLBLADDER: Within normal limits.  SPLEEN: Within normal limits.  PANCREAS: Focal dilatation of the main pancreatic duct in the region of the head measuring up to 9 mm.  ADRENALS: 1.5 cm indeterminate left adrenal nodule.  KIDNEYS/URETERS: Bilateral subcentimeter renal hypodensities, too small to characterize.    BLADDER: Underdistended  REPRODUCTIVE ORGANS: Thickening of the endometrium which measures 9 mm.    BOWEL: No bowel obstruction. Extensive colonic diverticulosis. Appendix is normal.  PERITONEUM: Mild atherosclerotic changes of the aorta.  VESSELS: Within normal limits.  RETROPERITONEUM/LYMPH NODES: No lymphadenopathy.    ABDOMINAL WALL: Within normal limits.  BONES: Multilevel degenerative changes of the spine.    IMPRESSION:     Multiple left rib fractures involving the 7th -11th ribs. Fracture of the left posterior 10th rib is comminuted and displaced.    Focal dilatation of the main pancreatic duct in the head measuring9 mm. Indeterminant 1.5 cm left adrenal nodule. Follow-up nonemergent MRI is recommended.    Abnormal thickening of the endometrium. Correlate with nonemergent pelvic ultrasound.    Additional findings as above.    Above findings were discussed with Dr. Lake on 3/20/2020 6:39 PM.                 DAYANNA LUCERO M.D., ATTENDING RADIOLOGIST  This document has been electronically signed. Mar 20 2020  6:43PM    < end of copied text >      < from: CT Angio Head w/ IV Cont (03.20.20 @ 18:08) >  EXAM:  CT ANGIO NECK (W)AW IC                          EXAM:  CT ANGIO BRAIN (W)AW IC                            PROCEDURE DATE:  03/20/2020          INTERPRETATION:  CLINICAL INDICATIONS:slurred speech, left sided weakness    TECHNIQUE: CTA brain and neck. 95 cc Omnipaque 350 Intravenous contrast was administered. 2-D MIP and 3-D volume rendering images.    COMPARISON: None    FINDINGS:    CTA BRAIN: Trace right cavernous carotid artery calcifications.  The [place] and vertebrobasilarsystem are unremarkable without evidence of stenosis, occlusion or saccular aneurysm dilation. No evidence for arterial venous malformation. The vertebral arteries are codominant.      CTA NECK: Trace left carotid bulb calcifications.  A left-sided aortic arch is demonstrated. There is normal relationship to the great vessels. The common carotid arteries, internal carotid arteries and vertebral arteries are normal in caliber. No evidence of stenosis, occlusion or saccular aneurysm dilation. The vertebral arteries are codominant.      IMPRESSION:      No large vessel occlusion.                MANASA SEAY M.D., ATTENDING RADIOLOGIST  This document has been electronically signed. Mar 20 2020  6:01PM                < end of copied text >

## 2020-03-22 NOTE — PROGRESS NOTE ADULT - SUBJECTIVE AND OBJECTIVE BOX
Patient is a 80y old  Female who presents with a chief complaint of headache, fall.    INTERVAL HPI/OVERNIGHT EVENTS: Pt denies weakness. Denies chest pain (or rib pain), SOB, fever, chills, vision changes, palpitations.    MEDICATIONS  (STANDING):  aspirin enteric coated 325 milliGRAM(s) Oral daily  atorvastatin 40 milliGRAM(s) Oral at bedtime  enoxaparin Injectable 40 milliGRAM(s) SubCutaneous daily  levothyroxine 25 MICROGram(s) Oral daily  sodium chloride 0.9%. 1000 milliLiter(s) (50 mL/Hr) IV Continuous <Continuous>    MEDICATIONS  (PRN):      Allergies    No Known Allergies    Intolerances        REVIEW OF SYSTEMS:  CONSTITUTIONAL: No fever or chills  HEENT:  No headache, no sore throat  RESPIRATORY: No cough, wheezing, or shortness of breath  CARDIOVASCULAR: No chest pain, palpitations  GASTROINTESTINAL: No abd pain, nausea, vomiting, or diarrhea  GENITOURINARY: No dysuria, frequency, or hematuria  NEUROLOGICAL: denies weakness; no dizziness  MUSCULOSKELETAL: no myalgias    Vital Signs Last 24 Hrs  T(C): 36.7 (22 Mar 2020 16:21), Max: 37.8 (21 Mar 2020 23:54)  T(F): 98.1 (22 Mar 2020 16:21), Max: 100.1 (21 Mar 2020 23:54)  HR: 71 (22 Mar 2020 16:21) (67 - 89)  BP: 138/69 (22 Mar 2020 16:21) (123/71 - 152/87)  BP(mean): --  RR: 18 (22 Mar 2020 16:21) (18 - 18)  SpO2: 93% (22 Mar 2020 16:21) (91% - 93%)    PHYSICAL EXAM:  GENERAL: No acute distress at rest  HEENT:  anicteric, moist mucous membranes  CHEST/LUNG:  CTA b/l, no rales, wheezes, or rhonchi  HEART:  RRR, S1, S2  ABDOMEN:  BS+, soft, nontender, nondistended  EXTREMITIES: no edema, cyanosis, or calf tenderness  NERVOUS SYSTEM: AA&Ox3 (knows exact date even), CN intact aside from some deficits in left facial nerve, 3+/5 strength in L UE, 5/5 in R UE; 4+/5 in R LE, 4/5 in L LE. left-sided neglect. Sensation to light touch intact.    LABS:                        9.5    9.02  )-----------( 205      ( 22 Mar 2020 07:40 )             28.9     CBC Full  -  ( 22 Mar 2020 07:40 )  WBC Count : 9.02 K/uL  Hemoglobin : 9.5 g/dL  Hematocrit : 28.9 %  Platelet Count - Automated : 205 K/uL  Mean Cell Volume : 93.2 fl  Mean Cell Hemoglobin : 30.6 pg  Mean Cell Hemoglobin Concentration : 32.9 gm/dL  Auto Neutrophil # : x  Auto Lymphocyte # : x  Auto Monocyte # : x  Auto Eosinophil # : x  Auto Basophil # : x  Auto Neutrophil % : x  Auto Lymphocyte % : x  Auto Monocyte % : x  Auto Eosinophil % : x  Auto Basophil % : x    22 Mar 2020 07:40    140    |  109    |  22     ----------------------------<  96     3.9     |  23     |  1.10     Ca    9.0        22 Mar 2020 07:40  Mg     1.8       22 Mar 2020 07:40    TPro  9.5    /  Alb  2.4    /  TBili  0.3    /  DBili  <.10   /  AST  36     /  ALT  38     /  AlkPhos  34     22 Mar 2020 07:40        CAPILLARY BLOOD GLUCOSE              RADIOLOGY & ADDITIONAL TESTS:    Personally reviewed.     Consultant(s) Notes Reviewed:  [x] YES  [ ] NO

## 2020-03-22 NOTE — PROGRESS NOTE ADULT - SUBJECTIVE AND OBJECTIVE BOX
Neurology follow up note    STEVEN RENNER80yFemale      Interval History:    Patient feels ok no new complaints.    MEDICATIONS    aspirin enteric coated 325 milliGRAM(s) Oral daily  atorvastatin 40 milliGRAM(s) Oral at bedtime  enoxaparin Injectable 40 milliGRAM(s) SubCutaneous daily  levothyroxine 25 MICROGram(s) Oral daily      Allergies    No Known Allergies    Intolerances            Vital Signs Last 24 Hrs  T(C): 36.8 (22 Mar 2020 07:05), Max: 37.8 (21 Mar 2020 23:54)  T(F): 98.3 (22 Mar 2020 07:05), Max: 100.1 (21 Mar 2020 23:54)  HR: 89 (22 Mar 2020 07:05) (58 - 89)  BP: 152/87 (22 Mar 2020 07:05) (146/80 - 154/75)  BP(mean): --  RR: 18 (22 Mar 2020 07:05) (18 - 18)  SpO2: 92% (22 Mar 2020 07:05) (91% - 94%)      REVIEW OF SYSTEMS:     Constitutional: No fever, chills, fatigue, weakness  Eyes: no eye pain, visual disturbances, or discharge  ENT:  No difficulty hearing, tinnitus, vertigo; No sinus or throat pain  Neck: No pain or stiffness  Respiratory: No cough, dyspnea, wheezing   Cardiovascular: No chest pain, palpitations,   Gastrointestinal: No abdominal or epigastric pain. No nausea, vomiting  No diarrhea or constipation.   Genitourinary: No dysuria, frequency, hematuria or incontinence  Neurological: No headaches, lightheadedness, vertigo, numbness or tremors  Psychiatric: No depression, anxiety, mood swings or difficulty sleeping  Musculoskeletal: No joint pain or swelling; No muscle, back or extremity pain  Skin: No itching, burning, rashes or lesions   Lymph Nodes: No enlarged glands  Endocrine: No heat or cold intolerance; No hair loss   Allergy and Immunologic: No hives or eczema    On Neurological Examination:    Mental Status - Patient is alert, awake      Follow simple commands  Follow complex commands      Speech -  slight Dysarthria                  Cranial Nerves - Pupils 3 mm equal and reactive to light,   extraocular eye movements intact. questionable per cut poorly cooperative with testing   smile symmetric  intact bilateral NLF    Motor Exam -   Right upper 5/5  Left upper 4/5--- positive drift today   Right lower 4+/5  Left lower  4/5      Muscle tone - is normal all over.  No asymmetry is seen.      Sensory    Bilateral intact to light touch    GENERAL Exam: Nontoxic , No Acute Distress   	  HEENT:  normocephalic, atraumatic  		  LUNGS: Clear bilaterally    	  HEART: Normal S1S2   No murmur RRR        	  GI/ ABDOMEN:  Soft  Non tender    EXTREMITIES:   No Edema  No Clubbing  No Cyanosis     MUSCULOSKELETAL: Normal Range of Motion  	   SKIN: Normal  No Ecchymosis               LABS:  CBC Full  -  ( 22 Mar 2020 07:40 )  WBC Count : 9.02 K/uL  RBC Count : 3.10 M/uL  Hemoglobin : 9.5 g/dL  Hematocrit : 28.9 %  Platelet Count - Automated : 205 K/uL  Mean Cell Volume : 93.2 fl  Mean Cell Hemoglobin : 30.6 pg  Mean Cell Hemoglobin Concentration : 32.9 gm/dL  Auto Neutrophil # : x  Auto Lymphocyte # : x  Auto Monocyte # : x  Auto Eosinophil # : x  Auto Basophil # : x  Auto Neutrophil % : x  Auto Lymphocyte % : x  Auto Monocyte % : x  Auto Eosinophil % : x  Auto Basophil % : x      03-22    140  |  109<H>  |  22  ----------------------------<  96  3.9   |  23  |  1.10    Ca    9.0      22 Mar 2020 07:40  Mg     1.8     03-22    TPro  9.5<H>  /  Alb  2.4<L>  /  TBili  0.3  /  DBili  <.10  /  AST  36  /  ALT  38  /  AlkPhos  34<L>  03-22    Hemoglobin A1C: Hemoglobin A1C, Whole Blood: 5.8 % (03-21 @ 11:00)    Lipid Panel 03-21 @ 11:10  Total Cholesterol, Serum 168  LDL 95  Triglycerides 119    LIVER FUNCTIONS - ( 22 Mar 2020 07:40 )  Alb: 2.4 g/dL / Pro: 9.5 g/dL / ALK PHOS: 34 U/L / ALT: 38 U/L / AST: 36 U/L / GGT: x           Vitamin B12   PT/INR - ( 20 Mar 2020 16:28 )   PT: 13.6 sec;   INR: 1.21 ratio               RADIOLOGY    ANALYSIS AND PLAN:  An 80-year-old with a history of memory issues, episode of fall, and cerebrovascular accident.  1.	For cerebrovascular accident, the patient would not be a TPA candidate since the patient possibly awoke with symptoms in the morning.  The patient's overall NIH stroke scale would be 1.  2.	Telemetry evaluation to rule out underlying arrhythmia.  3.	I will plan for MRI imaging of the brain.  4.	I will recommend aspirin 325 for three weeks and then 81 mg afterward.  5.	Will start the patient on statin.  6.	For history of hypertension, for now, we will allow permissive hypertensive for the next 24 hours.  Do not treat systolic blood pressure unless above 190 or diastolic above 105.  7.	For hypothyroidism, continue the patient on Synthroid.  8.	For episode of fall, questionable this was any type of presyncopal event, the patient was feeling lightheaded or from a cerebrovascular accident.  9.	For mild cognitive impairment, supportive treatment  10.	speech and swallow   11.	Spoke with the son, Valeriano, who is a healthcare proxy.  Advanced directives were discussed.  His telephone number is [phone].  12.	Greater than 45 minutes of time was spent with the patient, plan of care, reviewing data, speaking to the family, and multidisciplinary healthcare team.      Physical therapy evaluation if possible and as tolerated.  OOB to chair/ambulation with assistance only.    Greater than 45 minutes spent in direct patient care reviewing  the notes, lab data/ imaging , discussion with multidisciplinary team.

## 2020-03-22 NOTE — PROGRESS NOTE ADULT - PROBLEM SELECTOR PLAN 5
-CT abdomen pelvis w/ IV contrast performed for r/o obstruction, however showed multiple left rib fractures involving the 7th -11th ribs. Fracture of the left posterior 10th rib is comminuted and displaced.  -patient currently not in any pain, saturating well  -continue to monitor saturations  -incentive spirometry  -discussed with radiology, Dr. Saldana, who confirmed the fractures appear acute to subacute  -consulted pulm (Jaun) - recs appreciated

## 2020-03-23 LAB
% ALBUMIN: 36.5 % — SIGNIFICANT CHANGE UP
% ALPHA 1: 4.2 % — SIGNIFICANT CHANGE UP
% ALPHA 2: 10.5 % — SIGNIFICANT CHANGE UP
% BETA: 7 % — SIGNIFICANT CHANGE UP
% GAMMA: 41.8 % — SIGNIFICANT CHANGE UP
% M SPIKE: 38.1 % — SIGNIFICANT CHANGE UP
ALBUMIN SERPL ELPH-MCNC: 2.5 G/DL — LOW (ref 3.3–5)
ALBUMIN SERPL ELPH-MCNC: 3.2 G/DL — LOW (ref 3.6–5.5)
ALBUMIN/GLOB SERPL ELPH: 0.6 RATIO — SIGNIFICANT CHANGE UP
ALP SERPL-CCNC: 32 U/L — LOW (ref 40–120)
ALPHA1 GLOB SERPL ELPH-MCNC: 0.4 G/DL — SIGNIFICANT CHANGE UP (ref 0.1–0.4)
ALPHA2 GLOB SERPL ELPH-MCNC: 0.9 G/DL — SIGNIFICANT CHANGE UP (ref 0.5–1)
ALT FLD-CCNC: 31 U/L — SIGNIFICANT CHANGE UP (ref 12–78)
ANION GAP SERPL CALC-SCNC: 10 MMOL/L — SIGNIFICANT CHANGE UP (ref 5–17)
AST SERPL-CCNC: 35 U/L — SIGNIFICANT CHANGE UP (ref 15–37)
B-GLOBULIN SERPL ELPH-MCNC: 0.6 G/DL — SIGNIFICANT CHANGE UP (ref 0.5–1)
BILIRUB SERPL-MCNC: 0.4 MG/DL — SIGNIFICANT CHANGE UP (ref 0.2–1.2)
BUN SERPL-MCNC: 23 MG/DL — SIGNIFICANT CHANGE UP (ref 7–23)
CALCIUM SERPL-MCNC: 9.3 MG/DL — SIGNIFICANT CHANGE UP (ref 8.5–10.1)
CHLORIDE SERPL-SCNC: 107 MMOL/L — SIGNIFICANT CHANGE UP (ref 96–108)
CO2 SERPL-SCNC: 23 MMOL/L — SIGNIFICANT CHANGE UP (ref 22–31)
CREAT SERPL-MCNC: 0.98 MG/DL — SIGNIFICANT CHANGE UP (ref 0.5–1.3)
GAMMA GLOBULIN: 3.7 G/DL — HIGH (ref 0.6–1.6)
GLUCOSE SERPL-MCNC: 100 MG/DL — HIGH (ref 70–99)
HCT VFR BLD CALC: 32 % — LOW (ref 34.5–45)
HGB BLD-MCNC: 10.5 G/DL — LOW (ref 11.5–15.5)
INTERPRETATION SERPL IFE-IMP: SIGNIFICANT CHANGE UP
M-SPIKE: 3.4 G/DL — HIGH (ref 0–0)
MAGNESIUM SERPL-MCNC: 1.9 MG/DL — SIGNIFICANT CHANGE UP (ref 1.6–2.6)
MCHC RBC-ENTMCNC: 30.9 PG — SIGNIFICANT CHANGE UP (ref 27–34)
MCHC RBC-ENTMCNC: 32.8 GM/DL — SIGNIFICANT CHANGE UP (ref 32–36)
MCV RBC AUTO: 94.1 FL — SIGNIFICANT CHANGE UP (ref 80–100)
NRBC # BLD: 0 /100 WBCS — SIGNIFICANT CHANGE UP (ref 0–0)
PHOSPHATE SERPL-MCNC: 1.6 MG/DL — LOW (ref 2.5–4.5)
PLATELET # BLD AUTO: 217 K/UL — SIGNIFICANT CHANGE UP (ref 150–400)
POTASSIUM SERPL-MCNC: 3.6 MMOL/L — SIGNIFICANT CHANGE UP (ref 3.5–5.3)
POTASSIUM SERPL-SCNC: 3.6 MMOL/L — SIGNIFICANT CHANGE UP (ref 3.5–5.3)
PROT PATTERN SERPL ELPH-IMP: SIGNIFICANT CHANGE UP
PROT SERPL-MCNC: 8.8 G/DL — HIGH (ref 6–8.3)
PROT SERPL-MCNC: 8.8 G/DL — HIGH (ref 6–8.3)
PROT SERPL-MCNC: 9.6 G/DL — HIGH (ref 6–8.3)
RBC # BLD: 3.4 M/UL — LOW (ref 3.8–5.2)
RBC # FLD: 13.7 % — SIGNIFICANT CHANGE UP (ref 10.3–14.5)
SODIUM SERPL-SCNC: 140 MMOL/L — SIGNIFICANT CHANGE UP (ref 135–145)
WBC # BLD: 10.53 K/UL — HIGH (ref 3.8–10.5)
WBC # FLD AUTO: 10.53 K/UL — HIGH (ref 3.8–10.5)

## 2020-03-23 PROCEDURE — 93010 ELECTROCARDIOGRAM REPORT: CPT

## 2020-03-23 PROCEDURE — 99232 SBSQ HOSP IP/OBS MODERATE 35: CPT

## 2020-03-23 PROCEDURE — 93010 ELECTROCARDIOGRAM REPORT: CPT | Mod: 77

## 2020-03-23 PROCEDURE — 99233 SBSQ HOSP IP/OBS HIGH 50: CPT

## 2020-03-23 RX ORDER — SODIUM,POTASSIUM PHOSPHATES 278-250MG
1 POWDER IN PACKET (EA) ORAL ONCE
Refills: 0 | Status: DISCONTINUED | OUTPATIENT
Start: 2020-03-23 | End: 2020-03-23

## 2020-03-23 RX ORDER — POTASSIUM PHOSPHATE, MONOBASIC POTASSIUM PHOSPHATE, DIBASIC 236; 224 MG/ML; MG/ML
30 INJECTION, SOLUTION INTRAVENOUS ONCE
Refills: 0 | Status: COMPLETED | OUTPATIENT
Start: 2020-03-23 | End: 2020-03-23

## 2020-03-23 RX ADMIN — POTASSIUM PHOSPHATE, MONOBASIC POTASSIUM PHOSPHATE, DIBASIC 83.33 MILLIMOLE(S): 236; 224 INJECTION, SOLUTION INTRAVENOUS at 12:57

## 2020-03-23 RX ADMIN — SODIUM CHLORIDE 75 MILLILITER(S): 9 INJECTION INTRAMUSCULAR; INTRAVENOUS; SUBCUTANEOUS at 05:06

## 2020-03-23 RX ADMIN — ATORVASTATIN CALCIUM 40 MILLIGRAM(S): 80 TABLET, FILM COATED ORAL at 21:39

## 2020-03-23 RX ADMIN — Medication 325 MILLIGRAM(S): at 12:57

## 2020-03-23 RX ADMIN — Medication 25 MICROGRAM(S): at 05:04

## 2020-03-23 RX ADMIN — ENOXAPARIN SODIUM 40 MILLIGRAM(S): 100 INJECTION SUBCUTANEOUS at 12:57

## 2020-03-23 NOTE — OCCUPATIONAL THERAPY INITIAL EVALUATION ADULT - LEVEL OF INDEPENDENCE: TOILET, REHAB EVAL
not assessed due to limited standing tolerance/decreased safety; pt has external catheter in place at this time

## 2020-03-23 NOTE — PROGRESS NOTE ADULT - SUBJECTIVE AND OBJECTIVE BOX
Date/Time Patient Seen:  		  Referring MD:   Data Reviewed	       Patient is a 80y old  Female who presents with a chief complaint of acute CVA (23 Mar 2020 12:42)      Subjective/HPI     PAST MEDICAL & SURGICAL HISTORY:  Fracture of arm: right distal radius  Essential hypertension  Ulnar fracture: s/p fixation R ulnar  No significant past surgical history        Medication list         MEDICATIONS  (STANDING):  aspirin enteric coated 325 milliGRAM(s) Oral daily  atorvastatin 40 milliGRAM(s) Oral at bedtime  enoxaparin Injectable 40 milliGRAM(s) SubCutaneous daily  levothyroxine 25 MICROGram(s) Oral daily  sodium chloride 0.9%. 1000 milliLiter(s) (75 mL/Hr) IV Continuous <Continuous>    MEDICATIONS  (PRN):         Vitals log        ICU Vital Signs Last 24 Hrs  T(C): 36.7 (23 Mar 2020 11:29), Max: 37.4 (22 Mar 2020 23:35)  T(F): 98 (23 Mar 2020 11:29), Max: 99.3 (22 Mar 2020 23:35)  HR: 87 (23 Mar 2020 11:29) (71 - 87)  BP: 143/73 (23 Mar 2020 11:29) (138/69 - 164/83)  BP(mean): --  ABP: --  ABP(mean): --  RR: 18 (23 Mar 2020 11:29) (17 - 18)  SpO2: 94% (23 Mar 2020 11:29) (91% - 94%)           Input and Output:  I&O's Detail    22 Mar 2020 07:01  -  23 Mar 2020 07:00  --------------------------------------------------------  IN:  Total IN: 0 mL    OUT:    Voided: 400 mL  Total OUT: 400 mL    Total NET: -400 mL          Lab Data                        10.5   10.53 )-----------( 217      ( 23 Mar 2020 06:06 )             32.0     03-23    140  |  107  |  23  ----------------------------<  100<H>  3.6   |  23  |  0.98    Ca    9.3      23 Mar 2020 06:06  Phos  1.6     03-23  Mg     1.9     03-23    TPro  8.8<H>  /  Alb  x   /  TBili  x   /  DBili  x   /  AST  x   /  ALT  x   /  AlkPhos  x   03-23            Review of Systems	      Objective     Physical Examination    heart s1s2  lung dec BS      Pertinent Lab findings & Imaging      Naima:  NO   Adequate UO     I&O's Detail    22 Mar 2020 07:01  -  23 Mar 2020 07:00  --------------------------------------------------------  IN:  Total IN: 0 mL    OUT:    Voided: 400 mL  Total OUT: 400 mL    Total NET: -400 mL               Discussed with:     Cultures:	        Radiology

## 2020-03-23 NOTE — OCCUPATIONAL THERAPY INITIAL EVALUATION ADULT - TRANSFER TRAINING, PT EVAL
pt will improve sit to/from stands to minAx1 in prep for safe commode transfers within 3-5 sessions.

## 2020-03-23 NOTE — PROGRESS NOTE ADULT - PROBLEM SELECTOR PLAN 2
-had concern for possible undiagnosed multiple myeloma in setting of large discrepancy noted in CMP between total protein and albumin  -sent off SPEP and serum immunofixation this morning and pt found to have an IgG kappa band  -consulted heme (Dr. Escobar) who will see the pt in the morning

## 2020-03-23 NOTE — OCCUPATIONAL THERAPY INITIAL EVALUATION ADULT - PERTINENT HX OF CURRENT PROBLEM, REHAB EVAL
Pt is an 79 y/o female presenting with generalized weakness, difficulty walking and dizziness.  Pt s/p fall at home. (+) acute right parietal infarct; (+) left 7-11 rib fx.

## 2020-03-23 NOTE — SWALLOW BEDSIDE ASSESSMENT ADULT - SWALLOW EVAL: DIAGNOSIS
Moderate oral dysphagia marked by reduced labial seal, attention to bolus requiring cues to elicit swallow, increased oral transit time, suspect posterior loss with all PO.  Suspect pharyngeal dysphagia due to reduced laryngeal elevation upon palpation with all PO trials, +wet vocal quality post swallow with nectar & honey thick liquids.  No overt s/s aspiration after puree.
1. The patient demonstrated functional oral management of puree, honey thick, and nectar thick liquid textures marked by adequate bolus collection, transfer, and posterior transport. 2. The patient demonstrated a mild-moderate oral dysphagia for mechanical soft solids marked by prolonged oral manipulation and decreased mastication abilities likely 2/2 absent upper dentition resulting in delayed bolus collection, transfer, and posterior transport. Mild lingual residue noted subsequent to deglutition which partially cleared with a liquid wash. 3. The patient demonstrated a moderate oral dysphagia for thin liquids marked by delayed bolus collection and transfer with suspected posterior loss over the base of tongue. 4. The patient demonstrated a mild pharyngeal dysphagia for puree, mechanical soft solid, honey thick, and nectar thick liquids marked by a delayed pharyngeal swallow trigger with reduced hyolaryngeal elevation upon digital palpation w/o evidence of airway penetration. 5. The

## 2020-03-23 NOTE — DIETITIAN INITIAL EVALUATION ADULT. - PROBLEM SELECTOR PLAN 5
-CT abdomen pelvis w/ IV contrast performed for r/o obstruction, however showed multiple left rib fractures involving the 7th -11th ribs. Fracture of the left posterior 10th rib is comminuted and displaced.  -patient currently not in any pain, saturating well  -continue to monitor saturations  -incentive spirometry

## 2020-03-23 NOTE — PROGRESS NOTE ADULT - SUBJECTIVE AND OBJECTIVE BOX
[facility] -- Mario Jerry, José Miguel Randle Pannella, Patel, Savella  Office # [phone]      Follow Up:    abnormal EKG     Subjective/Observations:   No events overnight resting comfortably in bed.  No complaints of chest pain, dyspnea, or palpitations reported. No signs of orthopnea or PND.        REVIEW OF SYSTEMS: All other review of systems is negative unless indicated above    PAST MEDICAL & SURGICAL HISTORY:  Fracture of arm: right distal radius  Essential hypertension  Ulnar fracture: s/p fixation R ulnar      MEDICATIONS  (STANDING):  aspirin enteric coated 325 milliGRAM(s) Oral daily  atorvastatin 40 milliGRAM(s) Oral at bedtime  enoxaparin Injectable 40 milliGRAM(s) SubCutaneous daily  levothyroxine 25 MICROGram(s) Oral daily  potassium phosphate / sodium phosphate powder 1 Packet(s) Oral once  sodium chloride 0.9%. 1000 milliLiter(s) (75 mL/Hr) IV Continuous <Continuous>    MEDICATIONS  (PRN):      Allergies    No Known Allergies    Intolerances        Vital Signs Last 24 Hrs  T(C): 36.3 (23 Mar 2020 07:25), Max: 37.4 (22 Mar 2020 23:35)  T(F): 97.3 (23 Mar 2020 07:25), Max: 99.3 (22 Mar 2020 23:35)  HR: 80 (23 Mar 2020 07:25) (71 - 83)  BP: 154/76 (23 Mar 2020 07:25) (123/71 - 164/83)  BP(mean): --  RR: 17 (23 Mar 2020 07:25) (17 - 18)  SpO2: 91% (23 Mar 2020 07:25) (91% - 93%)    I&O's Summary    22 Mar 2020 07:01  -  23 Mar 2020 07:00  --------------------------------------------------------  IN: 0 mL / OUT: 400 mL / NET: -400 mL          PHYSICAL EXAM:  TELE: Sr with PAT frequent   Constitutional: NAD, awake and alert, well-developed  HEENT: Moist Mucous Membranes, Anicteric  Pulmonary: Non-labored, breath sounds are clear bilaterally, No wheezing, crackles or rhonchi  Cardiovascular: Regular, S1 and S2 nl, No murmurs, rubs, gallops or clicks  Gastrointestinal: Bowel Sounds present, soft, nontender.   Lymph: No lymphadenopathy. No peripheral edema.  Skin: No visible rashes or ulcers.  Psych:  Mood & affect appropriate    LABS: All Labs Reviewed:                        10.5   10.53 )-----------( 217      ( 23 Mar 2020 06:06 )             32.0                         9.5    9.02  )-----------( 205      ( 22 Mar 2020 07:40 )             28.9                         9.7    8.76  )-----------( 223      ( 21 Mar 2020 07:48 )             30.4     23 Mar 2020 06:06    140    |  107    |  23     ----------------------------<  100    3.6     |  23     |  0.98   22 Mar 2020 07:40    140    |  109    |  22     ----------------------------<  96     3.9     |  23     |  1.10   21 Mar 2020 07:48    138    |  109    |  20     ----------------------------<  86     3.9     |  21     |  1.10     Ca    9.3        23 Mar 2020 06:06  Ca    9.0        22 Mar 2020 07:40  Ca    9.3        21 Mar 2020 07:48  Phos  1.6       23 Mar 2020 06:06  Mg     1.9       23 Mar 2020 06:06  Mg     1.8       22 Mar 2020 07:40    TPro  9.6    /  Alb  2.5    /  TBili  0.4    /  DBili  x      /  AST  35     /  ALT  31     /  AlkPhos  32     23 Mar 2020 06:06  TPro  9.5    /  Alb  2.4    /  TBili  0.3    /  DBili  <.10   /  AST  36     /  ALT  38     /  AlkPhos  34     22 Mar 2020 07:40  TPro  10.0   /  Alb  2.8    /  TBili  0.3    /  DBili  x      /  AST  64     /  ALT  59     /  AlkPhos  45     20 Mar 2020 16:28        Echo:  < from: TTE Echo Complete w/o contrast w/ Doppler (03.20.20 @ 20:29) >    OBSERVATIONS:  Technically difficult study  Mitral Valve: Thickened leaflets, moderate MR.  Aortic Valve/Aorta: normal trileaflet aortic valve.  Tricuspid Valve: normal with mild TR.  Pulmonic Valve: normal  Left Atrium: normal  Right Atrium: Not well-visualized  Left Ventricle: normal LV size and systolic function, estimated LVEF of 55-60%. LVH  Right Ventricle: Grossly normal size and systolic function.  Pericardium/Pleura: normal, small pericardial effusion without hemodynamic compromise.  Pulmonary/RV Pressure: estimated PA systolic pressure of 35 mmHg assuming an RA pressure of 8 mmHg.  LV diastolic dysfunction is present    Conclusion:   Technically difficult study  Mild concentric LVH with normal internal dimensions and systolic function, estimated LVEF of 55-60%.   Grossly normal RV size and systolic function.    Normal trileaflet aortic valve, without AI.   Moderate MR  Mild TR.    Estimated PA systolic pressure of 35 mmHg. The IVC is dilated  Small pericardial effusion without hemodynamic compromise.        < end of copied text >      Radiology:

## 2020-03-23 NOTE — OCCUPATIONAL THERAPY INITIAL EVALUATION ADULT - IADL RETRAINING, OT EVAL
Patient will increase standing tolerance to 1-2 minutes in prep for safe commode transfers within 3-5 sessions

## 2020-03-23 NOTE — OCCUPATIONAL THERAPY INITIAL EVALUATION ADULT - RANGE OF MOTION EXAMINATION, UPPER EXTREMITY
Pt is right hand dominant. Decreased motor planning noted; pt with decreased ability to sequence for LUE opposition x5 digits. Sensation appears grossly intact to light touch BUE. Inattention noted to left side; pt required vc's to use LUE when assessing gross motor control finger to/from nose (+) mild dysmetria/bilateral UE Active ROM was WFL  (within functional limits)

## 2020-03-23 NOTE — CHART NOTE - NSCHARTNOTEFT_GEN_A_CORE
Hospitalist Attending Chart Update    Patient had an acute CVA and was evaluated by physical therapy who feel patient is a good candidate for and can tolerate acute rehab. I agree that patient can tolerate and will benefit from acute rehabilitation facility.

## 2020-03-23 NOTE — DIETITIAN INITIAL EVALUATION ADULT. - OTHER INFO
80 year old female with PMH of HTN, hypothyroid admitted for generalized weakness, r/o CVA.    Pt alert and oriented, slow to respond to questions however. States good appetite/intake prior to admission. Cooks for self and follows no specialized diet PTA. Diet recall: breakfast - coffee, raisin toast, lunch - tunafish sandwich, and dinner - chicken soup. Takes Vitamin B12 and D PTA. Wears dentures but they are not with her, daughter to bring from home if possible. Stated weight 151 pounds (got weighed at MD's x10 days ago). No h/o dysphagia PTA.    Endorses good appetite/intake presently. 100% of pureed tray. C/O mouth dryness, has swabs at bedside. Previously on dysphagia 1 pureed with no liquids however diet advanced today to pureed with nectar after repeat SLP evaluation. Denied N/V/diarrhea, endorsed mild constipation requesting stool softeners (MD Riley made aware).

## 2020-03-23 NOTE — PROGRESS NOTE ADULT - ASSESSMENT
81yo F w/ PMH of HTN and hypothyroidism presenting with generalized weakness, difficulty walking and dizziness admitted for acute right parietal lobe infarct      CVA  - MR Brain - Acute right MCA territorial infarct with no acute ICH  -Telemetry reviewed revealing Sr with frequent PAT, check 12 lead EKG   - Consider Implantable loop recorder in future for long-term monitoring for arrhythmogenic cause  - May consider transesophageal echocardiography with bubble study to r/o PFO for possible cause in the future  - Blood pressure control with permissive hypertension, systolic 150-190 with diastolic <105  - Continue statin  - Cont ASA as per neurology    Elevated trops  - Not consistent with plaque rupture no need to continue trend  - TTE - mild concentric LVH with normal Systolic function, LVEF 55-60%; Moderate MR, mild TR, small pericardial effusion    HTN  - Not on any antihypertensives.  - Allow for permissive HTN treat for SBP >190 and/or diastolic >105    Anemia  - Workup as per primary team      DVT PPX  - Lovenox as per primary team    Monitor Electrolytes and replete for K+ >4 and Mag >2  All other medical needs as per primary team  Further management can be dependent on her clinical course.  Will follow    Asiya Randhawa FNP-C  Cardiology NP  [id number] [phone] 79yo F w/ PMH of HTN and hypothyroidism presenting with generalized weakness, difficulty walking and dizziness admitted for acute right parietal lobe infarct    CVA  - MR Brain - Acute right MCA territorial infarct with no acute ICH  - Telemetry reviewed revealing Sr with frequent PAT, check 12 lead EKG   - Consider Implantable loop recorder in future for long-term monitoring for arrhythmogenic cause  - May consider transesophageal echocardiography with bubble study to r/o PFO for possible cause in the future  - Blood pressure control with permissive hypertension, systolic 150-190 with diastolic <105  - Continue statin  - Cont ASA as per neurology    Elevated trops  - Not consistent with plaque rupture no need to continue trend  - TTE - mild concentric LVH with normal Systolic function, LVEF 55-60%; Moderate MR, mild TR, small pericardial effusion    HTN  - Not on any antihypertensives.  - Allow for permissive HTN treat for SBP >190 and/or diastolic >105    Anemia  - Workup as per primary team      DVT PPX  - Lovenox as per primary team    Monitor Electrolytes and replete for K+ >4 and Mag >2  All other medical needs as per primary team  Further management can be dependent on her clinical course.  Will follow    Asiya Randhawa FNP-C  Cardiology NP  [facility] [phone]

## 2020-03-23 NOTE — SWALLOW BEDSIDE ASSESSMENT ADULT - COMMENTS
Chart reviewed order received for swallow evaluation.  Pt seen this morning for initial assessment, received sleeping in bed, arousable with cues, lethargic, cues required to maintain arousal, Ox2, reduced cognition, reduced attention to left, +dysarthria, pain scale 0/10 pre & post eval.  Pt left as received NAD RN & MD notified.  Will follow to reassess at bedside as schedule permits.    As per charting, pt is a "80 year old F PMH HTN and hypothyroidism presenting with generalized weakness, difficulty walking and dizziness.  Patient states that she was feeling fine yesterday however today felt like she had a frontal headache.  Patient at breakfast this morning and was able to keep it down, however had headache and took tylenol around 11:30am however felt nauseous and went to the bathroom.  She threw up tylenol and then felt a bit lightheaded and was walking towards bedroom when she fell in hallway.  Patient denies palpitations or diaphoresis prior to fall, denies feeling dizzy and states her R leg gave out.  Patient denies head trauma or LOC and was awake during the whole episode, her daughter was called who tried to get her up however she was unable to help get patient up (patient also felt week and was not able to get herself up).  Patient was then brought to ER by ambulance.  Patient states that she currently feels fine, no weakness or dizziness/ lightheadedness, denies any loss of sensation or weakness in any of the extremities.  Patient states that she had R wrist surgery, and therefore has some mild weakness however right now is not different from baseline.  Denies chest pain, palpitations, SOB, cough, abdominal pain, not currently nauseous, diarrhea/ constipation, weakness or loss of sensation, no sick contacts or COVID exposure.    In the ED, patient's vitals were: T97.7F, HR 64, /76, RR 18 and SpO2 96% on RA. Significant labs include: H/H 10.1/31.4, AST 64 and ALT 59. Trop .247.   CT Head: Acute right parietal lobe infarct."
The patient was seen at chairside this AM for a re-assessment of swallow function, at which time she was alert and cooperative. The patient is denying any pain pre/post today's evaluation and stated, "I am so thirsty." The patient was initially seen by this department on 3/21/20 (please see full report for details), at which time a puree with no liquid diet was recommended.    Per charting, the patient is an "79yo F w/ PMH of HTN and hypothyroidism presenting with generalized weakness, difficulty walking and dizziness admitted for acute right parietal lobe infarct, now with evidence for paroxysmal afib on telemetry." MRI of the head revealed, "Acute right MCA territorial infarct. No acute intracranial hemorrhage." Most recent CXR revealed, "Questionable left base density. PA and lateral study is recommended when clinically feasible." Discussed results and recommendations from this evaluation with the patient, RN, and left message with MD.

## 2020-03-23 NOTE — SWALLOW BEDSIDE ASSESSMENT ADULT - [facility] SWALLOW REFERRAL
Consider a nutritional consult to ensure the patient is meeting adequate daily caloric intake given current dysphagia state./Registered Dietitian

## 2020-03-23 NOTE — SWALLOW BEDSIDE ASSESSMENT ADULT - ORAL PHASE
Within functional limits Decreased anterior-posterior movement of the bolus/Lingual stasis/Delayed oral transit time Decreased anterior-posterior movement of the bolus/Delayed oral transit time

## 2020-03-23 NOTE — PROGRESS NOTE ADULT - SUBJECTIVE AND OBJECTIVE BOX
Neurology follow up note    STEVEN RENNER80yFemale      Interval History:    Patient feels ok no new complaints.    MEDICATIONS    aspirin enteric coated 325 milliGRAM(s) Oral daily  atorvastatin 40 milliGRAM(s) Oral at bedtime  enoxaparin Injectable 40 milliGRAM(s) SubCutaneous daily  levothyroxine 25 MICROGram(s) Oral daily  potassium phosphate IVPB 30 milliMole(s) IV Intermittent once  sodium chloride 0.9%. 1000 milliLiter(s) IV Continuous <Continuous>      Allergies    No Known Allergies    Intolerances            Vital Signs Last 24 Hrs  T(C): 36.3 (23 Mar 2020 07:25), Max: 37.4 (22 Mar 2020 23:35)  T(F): 97.3 (23 Mar 2020 07:25), Max: 99.3 (22 Mar 2020 23:35)  HR: 80 (23 Mar 2020 07:25) (71 - 83)  BP: 154/76 (23 Mar 2020 07:25) (138/69 - 164/83)  BP(mean): --  RR: 17 (23 Mar 2020 07:25) (17 - 18)  SpO2: 91% (23 Mar 2020 07:25) (91% - 93%)      REVIEW OF SYSTEMS:     Constitutional: No fever, chills, fatigue, weakness  Eyes: no eye pain, visual disturbances, or discharge  ENT:  No difficulty hearing, tinnitus, vertigo; No sinus or throat pain  Neck: No pain or stiffness  Respiratory: No cough, dyspnea, wheezing   Cardiovascular: No chest pain, palpitations,   Gastrointestinal: No abdominal or epigastric pain. No nausea, vomiting  No diarrhea or constipation.   Genitourinary: No dysuria, frequency, hematuria or incontinence  Neurological: No headaches, lightheadedness, vertigo, numbness or tremors  Psychiatric: No depression, anxiety, mood swings or difficulty sleeping  Musculoskeletal: No joint pain or swelling; No muscle, back or extremity pain  Skin: No itching, burning, rashes or lesions   Lymph Nodes: No enlarged glands  Endocrine: No heat or cold intolerance; No hair loss   Allergy and Immunologic: No hives or eczema    On Neurological Examination:    Mental Status - Patient is alert, awake      Follow simple commands  Follow complex commands      Speech -  slight Dysarthria                  Cranial Nerves - Pupils 3 mm equal and reactive to light,   extraocular eye movements intact. questionable per cut poorly cooperative with testing   smile symmetric  intact bilateral NLF    Motor Exam -   Right upper 5/5  Left upper 4/5--- positive drift today   Right lower 4+/5  Left lower  4/5      Muscle tone - is normal all over.  No asymmetry is seen.      Sensory    Bilateral intact to light touch    GENERAL Exam: Nontoxic , No Acute Distress   	  HEENT:  normocephalic, atraumatic  		  LUNGS: Clear bilaterally    	  HEART: Normal S1S2   No murmur RRR        	  GI/ ABDOMEN:  Soft  Non tender    EXTREMITIES:   No Edema  No Clubbing  No Cyanosis     MUSCULOSKELETAL: Normal Range of Motion  	   SKIN: Normal  No Ecchymosis             LABS:  CBC Full  -  ( 23 Mar 2020 06:06 )  WBC Count : 10.53 K/uL  RBC Count : 3.40 M/uL  Hemoglobin : 10.5 g/dL  Hematocrit : 32.0 %  Platelet Count - Automated : 217 K/uL  Mean Cell Volume : 94.1 fl  Mean Cell Hemoglobin : 30.9 pg  Mean Cell Hemoglobin Concentration : 32.8 gm/dL  Auto Neutrophil # : x  Auto Lymphocyte # : x  Auto Monocyte # : x  Auto Eosinophil # : x  Auto Basophil # : x  Auto Neutrophil % : x  Auto Lymphocyte % : x  Auto Monocyte % : x  Auto Eosinophil % : x  Auto Basophil % : x      03-23    140  |  107  |  23  ----------------------------<  100<H>  3.6   |  23  |  0.98    Ca    9.3      23 Mar 2020 06:06  Phos  1.6     03-23  Mg     1.9     03-23    TPro  8.8<H>  /  Alb  x   /  TBili  x   /  DBili  x   /  AST  x   /  ALT  x   /  AlkPhos  x   03-23    Hemoglobin A1C:     LIVER FUNCTIONS - ( 23 Mar 2020 09:10 )  Alb: x     / Pro: 8.8 g/dL / ALK PHOS: x     / ALT: x     / AST: x     / GGT: x           Vitamin B12         RADIOLOGY    ANALYSIS AND PLAN:  An 80-year-old with a history of memory issues, episode of fall, and cerebrovascular accident.  1.	For cerebrovascular accident, the patient would not be a TPA candidate since the patient possibly awoke with symptoms in the morning.  The patient's overall NIH stroke scale would be 1.  2.	Telemetry evaluation to rule out underlying arrhythmia.  3.	 MRI imaging of the brain.  4.	aspirin after 2 weeks ok ac if needed  and ok to dc aspirin    5.	patient on statin.  6.	For history of hypertension, for now, we will allow permissive hypertensive for the next 24 hours.  Do not treat systolic blood pressure unless above 190 or diastolic above 105.  7.	For hypothyroidism, continue the patient on Synthroid.  8.	For episode of fall, questionable this was any type of presyncopal event, the patient was feeling lightheaded or from a cerebrovascular accident.  9.	For mild cognitive impairment, supportive treatment  10.	Implantable loop recorder in future for long-term monitoring for arrhythmogenic cause  11.	Spoke with the son, Valeriano, who is a healthcare proxy.  3/23/20 His telephone number is [phone].  Physical therapy evaluation if possible and as tolerated.  OOB to chair/ambulation with assistance only.    Greater than 40  minutes spent in direct patient care reviewing  the notes, lab data/ imaging , discussion with multidisciplinary team.

## 2020-03-23 NOTE — PROGRESS NOTE ADULT - NSHPATTENDINGPLANDISCUSS_GEN_ALL_CORE
pt, pt's son, consultants, nursing

## 2020-03-23 NOTE — DIETITIAN INITIAL EVALUATION ADULT. - PROBLEM SELECTOR PLAN 1
-admit to tele  -patient with acute right parietal lobe infarct however no neurological deficits present at this time  -patient passed bedside dysphagia screen- will start on full diet  -hold home anti-HTN meds to allow for permissive HTN  -start atorvastatin 40mg QD  -f/u speech and swallow  -f/u MR brain  -f/u echo  -s/p asa 325mg in the ED, will continue with asa 325mg   -f/u TSH, HbA1C, lipid profile  -neuro checks   -PT consult  -fall precautions  -neuro, Dr. Shankar, consulted, f/u recs

## 2020-03-23 NOTE — SWALLOW BEDSIDE ASSESSMENT ADULT - ADDITIONAL RECOMMENDATIONS
Will follow to reassess at bedside as schedule permits.
This department to continue to follow during this admission as schedule permits.

## 2020-03-23 NOTE — PROGRESS NOTE ADULT - ASSESSMENT
81yo F w/ PMH of HTN and hypothyroidism presenting with generalized weakness, difficulty walking and dizziness admitted for acute right parietal lobe infarct, now with frequent PAT and possibly short runs of paroxysmal afib on telemetry.

## 2020-03-23 NOTE — PROGRESS NOTE ADULT - SUBJECTIVE AND OBJECTIVE BOX
Patient is a 80y old  Female who presents with a chief complaint of headache, fall.    INTERVAL HPI/OVERNIGHT EVENTS: No acute events overnight. Denies chest pain (or rib pain), SOB, fever, chills, vision changes, palpitations.      MEDICATIONS  (STANDING):  aspirin enteric coated 325 milliGRAM(s) Oral daily  atorvastatin 40 milliGRAM(s) Oral at bedtime  enoxaparin Injectable 40 milliGRAM(s) SubCutaneous daily  levothyroxine 25 MICROGram(s) Oral daily  sodium chloride 0.9%. 1000 milliLiter(s) (75 mL/Hr) IV Continuous <Continuous>    MEDICATIONS  (PRN):      Allergies    No Known Allergies    Intolerances        REVIEW OF SYSTEMS:  CONSTITUTIONAL: No fever or chills  HEENT:  No headache, no sore throat  RESPIRATORY: No cough, wheezing, or shortness of breath  CARDIOVASCULAR: No chest pain, palpitations  GASTROINTESTINAL: No abd pain, nausea, vomiting, or diarrhea  GENITOURINARY: No dysuria, frequency, or hematuria  NEUROLOGICAL: denies weakness; no dizziness  MUSCULOSKELETAL: no myalgias     Vital Signs Last 24 Hrs  T(C): 36.7 (23 Mar 2020 11:29), Max: 37.4 (22 Mar 2020 23:35)  T(F): 98 (23 Mar 2020 11:29), Max: 99.3 (22 Mar 2020 23:35)  HR: 87 (23 Mar 2020 11:29) (71 - 87)  BP: 143/73 (23 Mar 2020 11:29) (138/69 - 164/83)  BP(mean): --  RR: 18 (23 Mar 2020 11:29) (17 - 18)  SpO2: 94% (23 Mar 2020 11:29) (91% - 94%)    PHYSICAL EXAM:  GENERAL: No acute distress at rest  HEENT:  anicteric, moist mucous membranes  CHEST/LUNG:  CTA b/l, no rales, wheezes, or rhonchi  HEART:  RRR, S1, S2  ABDOMEN:  BS+, soft, nontender, nondistended  EXTREMITIES: no edema, cyanosis, or calf tenderness  NERVOUS SYSTEM: AA&Ox3 (knows exact date even), CN intact aside from some deficits in left facial nerve, 3+/5 strength in L UE, 5/5 in R UE; 4+/5 in R LE, 4/5 in L LE. left-sided neglect. Sensation to light touch intact.    LABS:                        10.5   10.53 )-----------( 217      ( 23 Mar 2020 06:06 )             32.0     CBC Full  -  ( 23 Mar 2020 06:06 )  WBC Count : 10.53 K/uL  Hemoglobin : 10.5 g/dL  Hematocrit : 32.0 %  Platelet Count - Automated : 217 K/uL  Mean Cell Volume : 94.1 fl  Mean Cell Hemoglobin : 30.9 pg  Mean Cell Hemoglobin Concentration : 32.8 gm/dL  Auto Neutrophil # : x  Auto Lymphocyte # : x  Auto Monocyte # : x  Auto Eosinophil # : x  Auto Basophil # : x  Auto Neutrophil % : x  Auto Lymphocyte % : x  Auto Monocyte % : x  Auto Eosinophil % : x  Auto Basophil % : x    23 Mar 2020 06:06    140    |  107    |  23     ----------------------------<  100    3.6     |  23     |  0.98     Ca    9.3        23 Mar 2020 06:06  Phos  1.6       23 Mar 2020 06:06  Mg     1.9       23 Mar 2020 06:06    TPro  8.8    /  Alb  3.2    /  TBili  x      /  DBili  x      /  AST  x      /  ALT  x      /  AlkPhos  x      23 Mar 2020 09:10        CAPILLARY BLOOD GLUCOSE              RADIOLOGY & ADDITIONAL TESTS:    Personally reviewed.     Consultant(s) Notes Reviewed:  [x] YES  [ ] NO Patient is a 80y old  Female who presents with a chief complaint of headache, fall.     INTERVAL HPI/OVERNIGHT EVENTS: No acute events overnight. Denies chest pain (or rib pain), SOB, fever, chills, vision changes, palpitations.      MEDICATIONS  (STANDING):  aspirin enteric coated 325 milliGRAM(s) Oral daily  atorvastatin 40 milliGRAM(s) Oral at bedtime  enoxaparin Injectable 40 milliGRAM(s) SubCutaneous daily  levothyroxine 25 MICROGram(s) Oral daily  sodium chloride 0.9%. 1000 milliLiter(s) (75 mL/Hr) IV Continuous <Continuous>    MEDICATIONS  (PRN):      Allergies    No Known Allergies    Intolerances        REVIEW OF SYSTEMS:  CONSTITUTIONAL: No fever or chills  HEENT:  No headache, no sore throat  RESPIRATORY: No cough, wheezing, or shortness of breath  CARDIOVASCULAR: No chest pain, palpitations  GASTROINTESTINAL: No abd pain, nausea, vomiting, or diarrhea  GENITOURINARY: No dysuria, frequency, or hematuria  NEUROLOGICAL: denies weakness; no dizziness  MUSCULOSKELETAL: no myalgias     Vital Signs Last 24 Hrs  T(C): 36.7 (23 Mar 2020 11:29), Max: 37.4 (22 Mar 2020 23:35)  T(F): 98 (23 Mar 2020 11:29), Max: 99.3 (22 Mar 2020 23:35)  HR: 87 (23 Mar 2020 11:29) (71 - 87)  BP: 143/73 (23 Mar 2020 11:29) (138/69 - 164/83)  BP(mean): --  RR: 18 (23 Mar 2020 11:29) (17 - 18)  SpO2: 94% (23 Mar 2020 11:29) (91% - 94%)    PHYSICAL EXAM:  GENERAL: No acute distress at rest  HEENT:  anicteric, moist mucous membranes  CHEST/LUNG:  CTA b/l, no rales, wheezes, or rhonchi  HEART:  RRR, S1, S2  ABDOMEN:  BS+, soft, nontender, nondistended  EXTREMITIES: no edema, cyanosis, or calf tenderness  NERVOUS SYSTEM: AA&Ox3 (knows exact date even), CN intact aside from some deficits in left facial nerve, 3+/5 strength in L UE, 5/5 in R UE; 4+/5 in R LE, 4/5 in L LE. left-sided neglect. Sensation to light touch intact.    LABS:                        10.5   10.53 )-----------( 217      ( 23 Mar 2020 06:06 )             32.0     CBC Full  -  ( 23 Mar 2020 06:06 )  WBC Count : 10.53 K/uL  Hemoglobin : 10.5 g/dL  Hematocrit : 32.0 %  Platelet Count - Automated : 217 K/uL  Mean Cell Volume : 94.1 fl  Mean Cell Hemoglobin : 30.9 pg  Mean Cell Hemoglobin Concentration : 32.8 gm/dL  Auto Neutrophil # : x  Auto Lymphocyte # : x  Auto Monocyte # : x  Auto Eosinophil # : x  Auto Basophil # : x  Auto Neutrophil % : x  Auto Lymphocyte % : x  Auto Monocyte % : x  Auto Eosinophil % : x  Auto Basophil % : x    23 Mar 2020 06:06    140    |  107    |  23     ----------------------------<  100    3.6     |  23     |  0.98     Ca    9.3        23 Mar 2020 06:06  Phos  1.6       23 Mar 2020 06:06  Mg     1.9       23 Mar 2020 06:06    TPro  8.8    /  Alb  3.2    /  TBili  x      /  DBili  x      /  AST  x      /  ALT  x      /  AlkPhos  x      23 Mar 2020 09:10        CAPILLARY BLOOD GLUCOSE              RADIOLOGY & ADDITIONAL TESTS:    Personally reviewed.     Consultant(s) Notes Reviewed:  [x] YES  [ ] NO

## 2020-03-23 NOTE — SWALLOW BEDSIDE ASSESSMENT ADULT - ASR SWALLOW ASPIRATION MONITOR
position upright (90Y)/fever/change of breathing pattern/cough/gurgly voice/oral hygiene/If any s/s aspiration noted, d/c PO & Initiate NPO/pneumonia/throat clearing/upper respiratory infection
change of breathing pattern/position upright (90Y)/gurgly voice/upper respiratory infection/fever/pneumonia/throat clearing/oral hygiene/cough

## 2020-03-23 NOTE — SWALLOW BEDSIDE ASSESSMENT ADULT - SWALLOW EVAL: PROGNOSIS
Good for rx'd diet.
DX CONT: patient demonstrated a severe pharyngeal dysphagia for thin liquids marked by a delayed pharyngeal swallow trigger with reduced hyolaryngeal elevation upon digital palpation resulting in multiple swallows suggestive of pharyngeal stasis and/or airway penetration and coughing further suggestive of airway penetration.                                                                                                                                                                                                                                                            PROGNOSIS: Good for recommended diet

## 2020-03-23 NOTE — SWALLOW BEDSIDE ASSESSMENT ADULT - SWALLOW EVAL: RECOMMENDED FEEDING/EATING TECHNIQUES
FEED ONLY WHEN AWAKE/ALERT; Small bites; Consider short-term non-oral means of hydration/check mouth frequently for oral residue/pocketing/crush medication (when feasible)/position upright (90 degrees)/maintain upright posture during/after eating for 30 mins/oral hygiene/allow for swallow between intakes
crush medication (when feasible)/no straws/position upright (90 degrees)/small sips/bites/allow for swallow between intakes/alternate food with liquid/maintain upright posture during/after eating for 30 mins/oral hygiene

## 2020-03-23 NOTE — DIETITIAN INITIAL EVALUATION ADULT. - ENERGY NEEDS
Wt: 151 pounds, Ht: 65 inches (stated height), BMI: 25 kg/m2, IBW: 120 pounds +/-10%, %IBW: 126%  no edema or pressure injuries

## 2020-03-23 NOTE — OCCUPATIONAL THERAPY INITIAL EVALUATION ADULT - ADDITIONAL COMMENTS
Pt reported she lives with her  in a private home with no stairs to enter; PTA she was independent with ADLs and functional mobility without AD. Pt reported spouse assisted with IADLs. (+) .

## 2020-03-23 NOTE — DIETITIAN INITIAL EVALUATION ADULT. - ADD RECOMMEND
Diet texture and fluid consistency per SLP. Encourage po/fluid intake. Aspiration precautions - reinforced with patient need to sit upright with meals/fluids and thicken all fluids to nectar consistency. No need for oral nutritional supplement such as Ensure/magic cup at this time (also pt states "milky products" make her cough). Bowel regimen per MD's discretion, Dr. Riley made aware that pt with constipation requesting stool softener.

## 2020-03-23 NOTE — SWALLOW BEDSIDE ASSESSMENT ADULT - PHARYNGEAL PHASE
Decreased laryngeal elevation/Delayed pharyngeal swallow Delayed pharyngeal swallow/Decreased laryngeal elevation Multiple swallows/Decreased laryngeal elevation/Delayed pharyngeal swallow/Cough post oral intake

## 2020-03-23 NOTE — DIETITIAN INITIAL EVALUATION ADULT. - PROBLEM SELECTOR PLAN 2
-patient noted to have elevated trop on admission, likely 2/2 demand ischemia in setting of acute CVA  -will trend for 2 more sets, patient currently asymptomatic  -f/u EKG  -f/u echo  -Cardio, [facility], consulted, f/u recs

## 2020-03-23 NOTE — SWALLOW BEDSIDE ASSESSMENT ADULT - MUCOSAL QUALITY
Addended by: AHMET CHEEK on: 9/28/2018 01:36 PM     Modules accepted: Orders     Oral cavity clear though dry. Oral care via cold water swabbing provided to hydrate tissue.

## 2020-03-24 ENCOUNTER — TRANSCRIPTION ENCOUNTER (OUTPATIENT)
Age: 80
End: 2020-03-24

## 2020-03-24 VITALS
OXYGEN SATURATION: 93 % | TEMPERATURE: 98 F | DIASTOLIC BLOOD PRESSURE: 82 MMHG | HEART RATE: 70 BPM | SYSTOLIC BLOOD PRESSURE: 137 MMHG | RESPIRATION RATE: 20 BRPM

## 2020-03-24 DIAGNOSIS — D89.2 HYPERGAMMAGLOBULINEMIA, UNSPECIFIED: ICD-10-CM

## 2020-03-24 DIAGNOSIS — E83.39 OTHER DISORDERS OF PHOSPHORUS METABOLISM: ICD-10-CM

## 2020-03-24 LAB
ANION GAP SERPL CALC-SCNC: 8 MMOL/L — SIGNIFICANT CHANGE UP (ref 5–17)
BUN SERPL-MCNC: 23 MG/DL — SIGNIFICANT CHANGE UP (ref 7–23)
CALCIUM SERPL-MCNC: 8.9 MG/DL — SIGNIFICANT CHANGE UP (ref 8.5–10.1)
CHLORIDE SERPL-SCNC: 108 MMOL/L — SIGNIFICANT CHANGE UP (ref 96–108)
CO2 SERPL-SCNC: 25 MMOL/L — SIGNIFICANT CHANGE UP (ref 22–31)
CREAT SERPL-MCNC: 0.99 MG/DL — SIGNIFICANT CHANGE UP (ref 0.5–1.3)
GLUCOSE SERPL-MCNC: 102 MG/DL — HIGH (ref 70–99)
HCT VFR BLD CALC: 29.9 % — LOW (ref 34.5–45)
HGB BLD-MCNC: 9.9 G/DL — LOW (ref 11.5–15.5)
MAGNESIUM SERPL-MCNC: 1.8 MG/DL — SIGNIFICANT CHANGE UP (ref 1.6–2.6)
MCHC RBC-ENTMCNC: 31.1 PG — SIGNIFICANT CHANGE UP (ref 27–34)
MCHC RBC-ENTMCNC: 33.1 GM/DL — SIGNIFICANT CHANGE UP (ref 32–36)
MCV RBC AUTO: 94 FL — SIGNIFICANT CHANGE UP (ref 80–100)
NRBC # BLD: 0 /100 WBCS — SIGNIFICANT CHANGE UP (ref 0–0)
PHOSPHATE SERPL-MCNC: 1.5 MG/DL — LOW (ref 2.5–4.5)
PLATELET # BLD AUTO: 200 K/UL — SIGNIFICANT CHANGE UP (ref 150–400)
POTASSIUM SERPL-MCNC: 3.7 MMOL/L — SIGNIFICANT CHANGE UP (ref 3.5–5.3)
POTASSIUM SERPL-SCNC: 3.7 MMOL/L — SIGNIFICANT CHANGE UP (ref 3.5–5.3)
RBC # BLD: 3.18 M/UL — LOW (ref 3.8–5.2)
RBC # FLD: 13.7 % — SIGNIFICANT CHANGE UP (ref 10.3–14.5)
SODIUM SERPL-SCNC: 141 MMOL/L — SIGNIFICANT CHANGE UP (ref 135–145)
WBC # BLD: 9.32 K/UL — SIGNIFICANT CHANGE UP (ref 3.8–10.5)
WBC # FLD AUTO: 9.32 K/UL — SIGNIFICANT CHANGE UP (ref 3.8–10.5)

## 2020-03-24 PROCEDURE — 70498 CT ANGIOGRAPHY NECK: CPT

## 2020-03-24 PROCEDURE — 83540 ASSAY OF IRON: CPT

## 2020-03-24 PROCEDURE — 85027 COMPLETE CBC AUTOMATED: CPT

## 2020-03-24 PROCEDURE — 74177 CT ABD & PELVIS W/CONTRAST: CPT

## 2020-03-24 PROCEDURE — 93306 TTE W/DOPPLER COMPLETE: CPT

## 2020-03-24 PROCEDURE — 82607 VITAMIN B-12: CPT

## 2020-03-24 PROCEDURE — 82553 CREATINE MB FRACTION: CPT

## 2020-03-24 PROCEDURE — 84100 ASSAY OF PHOSPHORUS: CPT

## 2020-03-24 PROCEDURE — 84484 ASSAY OF TROPONIN QUANT: CPT

## 2020-03-24 PROCEDURE — 83550 IRON BINDING TEST: CPT

## 2020-03-24 PROCEDURE — 80061 LIPID PANEL: CPT

## 2020-03-24 PROCEDURE — 96374 THER/PROPH/DIAG INJ IV PUSH: CPT

## 2020-03-24 PROCEDURE — 97116 GAIT TRAINING THERAPY: CPT

## 2020-03-24 PROCEDURE — 82746 ASSAY OF FOLIC ACID SERUM: CPT

## 2020-03-24 PROCEDURE — 85610 PROTHROMBIN TIME: CPT

## 2020-03-24 PROCEDURE — 83521 IG LIGHT CHAINS FREE EACH: CPT

## 2020-03-24 PROCEDURE — 83036 HEMOGLOBIN GLYCOSYLATED A1C: CPT

## 2020-03-24 PROCEDURE — 97166 OT EVAL MOD COMPLEX 45 MIN: CPT

## 2020-03-24 PROCEDURE — 82784 ASSAY IGA/IGD/IGG/IGM EACH: CPT

## 2020-03-24 PROCEDURE — 80076 HEPATIC FUNCTION PANEL: CPT

## 2020-03-24 PROCEDURE — 84155 ASSAY OF PROTEIN SERUM: CPT

## 2020-03-24 PROCEDURE — 86334 IMMUNOFIX E-PHORESIS SERUM: CPT

## 2020-03-24 PROCEDURE — 93010 ELECTROCARDIOGRAM REPORT: CPT

## 2020-03-24 PROCEDURE — 82550 ASSAY OF CK (CPK): CPT

## 2020-03-24 PROCEDURE — 80053 COMPREHEN METABOLIC PANEL: CPT

## 2020-03-24 PROCEDURE — 84165 PROTEIN E-PHORESIS SERUM: CPT

## 2020-03-24 PROCEDURE — 71045 X-RAY EXAM CHEST 1 VIEW: CPT

## 2020-03-24 PROCEDURE — 97162 PT EVAL MOD COMPLEX 30 MIN: CPT

## 2020-03-24 PROCEDURE — 93308 TTE F-UP OR LMTD: CPT

## 2020-03-24 PROCEDURE — 80048 BASIC METABOLIC PNL TOTAL CA: CPT

## 2020-03-24 PROCEDURE — 99239 HOSP IP/OBS DSCHRG MGMT >30: CPT

## 2020-03-24 PROCEDURE — 93005 ELECTROCARDIOGRAM TRACING: CPT

## 2020-03-24 PROCEDURE — 83735 ASSAY OF MAGNESIUM: CPT

## 2020-03-24 PROCEDURE — 82728 ASSAY OF FERRITIN: CPT

## 2020-03-24 PROCEDURE — 99232 SBSQ HOSP IP/OBS MODERATE 35: CPT

## 2020-03-24 PROCEDURE — 97535 SELF CARE MNGMENT TRAINING: CPT

## 2020-03-24 PROCEDURE — 70551 MRI BRAIN STEM W/O DYE: CPT

## 2020-03-24 PROCEDURE — 70496 CT ANGIOGRAPHY HEAD: CPT

## 2020-03-24 PROCEDURE — 82962 GLUCOSE BLOOD TEST: CPT

## 2020-03-24 PROCEDURE — 70450 CT HEAD/BRAIN W/O DYE: CPT

## 2020-03-24 PROCEDURE — 84443 ASSAY THYROID STIM HORMONE: CPT

## 2020-03-24 PROCEDURE — 92610 EVALUATE SWALLOWING FUNCTION: CPT

## 2020-03-24 PROCEDURE — 36415 COLL VENOUS BLD VENIPUNCTURE: CPT

## 2020-03-24 PROCEDURE — 99285 EMERGENCY DEPT VISIT HI MDM: CPT

## 2020-03-24 RX ORDER — ASPIRIN/CALCIUM CARB/MAGNESIUM 324 MG
1 TABLET ORAL
Qty: 0 | Refills: 0 | DISCHARGE
Start: 2020-03-24

## 2020-03-24 RX ORDER — METOPROLOL TARTRATE 50 MG
1 TABLET ORAL
Qty: 0 | Refills: 0 | DISCHARGE

## 2020-03-24 RX ORDER — ATORVASTATIN CALCIUM 80 MG/1
1 TABLET, FILM COATED ORAL
Qty: 0 | Refills: 0 | DISCHARGE
Start: 2020-03-24

## 2020-03-24 RX ORDER — ENOXAPARIN SODIUM 100 MG/ML
40 INJECTION SUBCUTANEOUS
Qty: 0 | Refills: 0 | DISCHARGE
Start: 2020-03-24

## 2020-03-24 RX ORDER — SODIUM,POTASSIUM PHOSPHATES 278-250MG
1 POWDER IN PACKET (EA) ORAL ONCE
Refills: 0 | Status: DISCONTINUED | OUTPATIENT
Start: 2020-03-24 | End: 2020-03-24

## 2020-03-24 RX ORDER — SODIUM,POTASSIUM PHOSPHATES 278-250MG
1 POWDER IN PACKET (EA) ORAL
Qty: 0 | Refills: 0 | DISCHARGE
Start: 2020-03-24

## 2020-03-24 RX ORDER — SODIUM,POTASSIUM PHOSPHATES 278-250MG
1 POWDER IN PACKET (EA) ORAL ONCE
Refills: 0 | Status: COMPLETED | OUTPATIENT
Start: 2020-03-24 | End: 2020-03-24

## 2020-03-24 RX ORDER — POTASSIUM PHOSPHATE, MONOBASIC POTASSIUM PHOSPHATE, DIBASIC 236; 224 MG/ML; MG/ML
30 INJECTION, SOLUTION INTRAVENOUS ONCE
Refills: 0 | Status: COMPLETED | OUTPATIENT
Start: 2020-03-24 | End: 2020-03-24

## 2020-03-24 RX ADMIN — Medication 1 TABLET(S): at 13:46

## 2020-03-24 RX ADMIN — POTASSIUM PHOSPHATE, MONOBASIC POTASSIUM PHOSPHATE, DIBASIC 83.33 MILLIMOLE(S): 236; 224 INJECTION, SOLUTION INTRAVENOUS at 12:02

## 2020-03-24 RX ADMIN — Medication 325 MILLIGRAM(S): at 12:01

## 2020-03-24 RX ADMIN — Medication 25 MICROGRAM(S): at 05:54

## 2020-03-24 RX ADMIN — ENOXAPARIN SODIUM 40 MILLIGRAM(S): 100 INJECTION SUBCUTANEOUS at 12:02

## 2020-03-24 NOTE — PROGRESS NOTE ADULT - ATTENDING COMMENTS
I personally saw and examined the patient in detail.  I have spoken to the above provider regarding the assessment and plan of care.  I reviewed the above assessment and plan of care, and agree.  I have made changes in the body of the note where appropriate.
Chart reviewed    Patient seen and examined    Agree with plan as outlined above
Seen/examined. agree with above.  pat noted on telemetry, raising possibility of paf
Note written by attending, see above.  Time spent: 45min. More than 50% of the visit was spent counseling the patient /pt's son on her condition - acute CVA, dysphagia, hemiparesis, LINDA.
Note written by attending, see above.  Time spent: 45min. More than 50% of the visit was spent counseling the patient /pt's son on her condition - acute CVA, dysphagia, hemiparesis, incidental CT Abd/P findings, LINDA.
Note written by attending, see above.  Time spent: 45min. More than 50% of the visit was spent counseling the patient /pt's son on her condition - acute CVA, dysphagia, hemiparesis, incidental CT Abd/P findings, LINDA, IgG kappa band paraproteinemia.

## 2020-03-24 NOTE — CONSULT NOTE ADULT - SUBJECTIVE AND OBJECTIVE BOX
Patient is a 80y old  Female who presents with a chief complaint of acute CVA (24 Mar 2020 10:08)      HPI:  80 year old F PMH HTN and hypothyroidism presented on 3/20/20 with generalized weakness, difficulty walking and dizziness as well as frontal headache.     CT Head: Acute right parietal lobe infarct.   CTA: No large vessel occlusion.  CTA head: Trace right cavernous carotid artery calcifications.  The [place] and vertebrobasilar system are unremarkable without evidence of stenosis, occlusion or saccular aneurysm dilation. No evidence for arterial venous malformation. The vertebral arteries are codominant.  CTA NECK: Trace left carotid bulb calcifications. A left-sided aortic arch is demonstrated. There is normal relationship to the great vessels. The common carotid arteries, internal carotid arteries and vertebral arteries are normal in caliber. No evidence of stenosis, occlusion or saccular aneurysm dilation. The vertebral arteries are codominant.  CTA abdomen/ pelvis with IV contrast: Multiple left rib fractures involving the 7th -11th ribs. Fracture of the left posterior 10th rib is comminuted and displaced. Focal dilatation of the main pancreatic duct in the head measuring9 mm. Indeterminant 1.5 cm left adrenal nodule. Follow-up nonemergent MRI is recommended. Abnormal thickening of the endometrium. Correlate with nonemergent pelvic ultrasound.    Seen by Neuro and deemed clinically to have right hemispheric CVA  Noted also to have high protein/albumin ratio concerning for plasma cell disorder; heme/onc asked for consultation       ROS:  Negative except for: weakness, dizziness improved    PAST MEDICAL & SURGICAL HISTORY:  Fracture of arm: right distal radius  Essential hypertension  Ulnar fracture: s/p fixation R ulnar      SOCIAL HISTORY:  lives at home  denies tobacco  denies ETOH use    FAMILY HISTORY:  No pertinent family history in first degree relatives      MEDICATIONS  (STANDING):  aspirin enteric coated 325 milliGRAM(s) Oral daily  atorvastatin 40 milliGRAM(s) Oral at bedtime  enoxaparin Injectable 40 milliGRAM(s) SubCutaneous daily  levothyroxine 25 MICROGram(s) Oral daily  potassium phosphate IVPB 30 milliMole(s) IV Intermittent once  sodium chloride 0.9%. 1000 milliLiter(s) (75 mL/Hr) IV Continuous <Continuous>    MEDICATIONS  (PRN):      Allergies    No Known Allergies    Intolerances        Vital Signs Last 24 Hrs  T(C): 37.1 (24 Mar 2020 07:38), Max: 37.4 (24 Mar 2020 04:35)  T(F): 98.7 (24 Mar 2020 07:38), Max: 99.4 (24 Mar 2020 04:35)  HR: 65 (24 Mar 2020 07:38) (65 - 87)  BP: 142/66 (24 Mar 2020 07:38) (138/76 - 160/100)  BP(mean): --  RR: 21 (24 Mar 2020 07:38) (18 - 21)  SpO2: 91% (24 Mar 2020 07:38) (91% - 96%)    PHYSICAL EXAM  General: adult in NAD  HEENT: clear oropharynx, anicteric sclera, pink conjunctivae  Neck: supple  CV: normal S1S2 with no murmur rubs or gallops  Lungs: clear to auscultation, no wheezes, no rhales  Abdomen: soft non-tender non-distended, no hepato/splenomegaly  Ext: no clubbing cyanosis or edema  Skin: no rashes and no petichiae  Neuro: + left facial droop; strengths equal bilaterally     LABS:    CBC Full  -  ( 24 Mar 2020 07:42 )  WBC Count : 9.32 K/uL  RBC Count : 3.18 M/uL  Hemoglobin : 9.9 g/dL  Hematocrit : 29.9 %  Platelet Count - Automated : 200 K/uL  Mean Cell Volume : 94.0 fl  Mean Cell Hemoglobin : 31.1 pg  Mean Cell Hemoglobin Concentration : 33.1 gm/dL    Hemoglobin: 9.9 g/dL (03-24 @ 07:42)  Hemoglobin: 10.5 g/dL (03-23 @ 06:06)  Hemoglobin: 9.5 g/dL (03-22 @ 07:40)  Hemoglobin: 9.7 g/dL (03-21 @ 07:48)  Hemoglobin: 10.1 g/dL (03-20 @ 16:28)      03-24    141  |  108  |  23  ----------------------------<  102<H>  3.7   |  25  |  0.99    Ca    8.9      24 Mar 2020 07:42  Phos  1.5     03-24  Mg     1.8     03-24    TPro  8.8<H>  /  Alb  3.2<L>  /  TBili  x   /  DBili  x   /  AST  x   /  ALT  x   /  AlkPhos  x   03-23    Immunofixation, Serum:    IgG Kappa Band Identified (03.23.20 @ 09:10)    Protein Electrophoresis, Serum (03.23.20 @ 09:10)    Protein Total, Serum: 8.8 g/dL    Total Protein, Serum: 8.8 g/dL    Albumin, Serum: 3.2 g/dL    Alpha 1: 0.4 g/dL    Alpha 2: 0.9 g/dL    Beta Globulin: 0.6 g/dL    Gamma Globulin: 3.7 g/dL    M-Tavo: 3.4 g/dL    % Albumin: 36.5 %    % Alpha 1: 4.2 %    % Alpha 2: 10.5 %    % Beta: 7.0 %    % Gamma: 41.8 %    % M Tavo: 38.1 %    Albumin/Globulin Ratio: 0.6 Ratio    Serum Protein 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

## 2020-03-24 NOTE — PROGRESS NOTE ADULT - PROBLEM SELECTOR PROBLEM 1
Abnormal CT of the abdomen
Rib fractures
Cerebrovascular accident (CVA), unspecified mechanism

## 2020-03-24 NOTE — DISCHARGE NOTE NURSING/CASE MANAGEMENT/SOCIAL WORK - PATIENT PORTAL LINK FT
You can access the FollowMyHealth Patient Portal offered by [facility] by registering at the following website: [email or web address]. By joining [email or web address]’s FollowMyHealth portal, you will also be able to view your health information using other applications (apps) compatible with our system.

## 2020-03-24 NOTE — PROGRESS NOTE ADULT - PROBLEM SELECTOR PLAN 1
left rib fx - multiple - needs to use I sameer - communicated to pt and rn  HOB elev  asp prec  dysphagia - swallow eval noted -   CVA - mri brain reviewed, TTE reviewed - ct abd reviewed  supportive measures  cvs rx regimen and BP control  on tele monitor   cardio and neuro follow up noted  discussed plan of care with the patient  will need LINDA on DC likely.
seen and examined  vs and HD noted  on room air  dc planning under way -   left rib fx - multiple - needs to use I sameer - communicated to pt and rn  HOB elev  asp prec  dysphagia - swallow eval noted -   CVA - mri brain reviewed, TTE reviewed - ct abd reviewed  supportive measures  cvs rx regimen and BP control  on tele monitor   cardio and neuro follow up noted  discussed plan of care with the patient  will need LINDA on DC likely.
-monitor on tele for any arrhythmia (has been SR so far)  -patient with acute right parietal lobe infarct confirmed on MRI today as ordered by neuro  -patient has dysphagia as per S&S eval and diet changed to Dysphagia 1 pureed with no liquids.  -hold home anti-HTN meds to allow for permissive HTN  -c/w ASA 325mg and atorvastatin 40mg QHS  -phys therapy rec LINDA - SW to work with family and pt on disposition  -TTE showing normal systolic LV function, LVEF of 55-60%, +diastolic dysfunction, moderate MR   -f/u TSH, HbA1C (5.8%), lipid profile (LDL of 95, HDL of 49)  -neuro checks   -PT consult  -fall precautions  -neuro, Dr. Shankar and cardio Dr. Randle group -- recs appreciated  -carotids without significant stenosis  -may need ILR as outpt
-monitor on tele for any arrhythmia -- had evidence for brief run of paroxysmal afib today  -patient with acute right parietal lobe infarct confirmed on MRI  -patient has dysphagia as per S&S eval and rec Dysphagia 1 pureed with no liquids.  -holding home metoprolol to allow for permissive HTN, but BP not particularly elevated, so neuro rec starting NS  -c/w ASA 325mg and atorvastatin 40mg QHS  -discussed timing to start full A/C for the suspected afib with Dr. Shankar who recommended holding off at least 3 weeks because pt had a large ischemic CVA with concern for potential hemorrhagic conversion  -phys therapy rec LINDA - SW to work with family and pt on disposition  -TTE showing normal systolic LV function, LVEF of 55-60%, +diastolic dysfunction, moderate MR   -TSH normal, HbA1C (5.8%), lipid profile (LDL of 95, HDL of 49)  -neuro checks   -PT consult  -fall precautions  -neuro, Dr. Shankar and cardio Dr. Randle group -- recs appreciated  -carotids without significant stenosis  -may need ILR as outpt unless more clear cut runs of afib occur during this admission, as per discussion with cardio
-monitor on tele for any arrhythmia -- has frequent PAT and possibly short runs of paroxysmal afib   -patient with acute right parietal lobe infarct confirmed on MRI  -S&S re-eval and now rec dysphagia 1 pureed with nectar-consistency liquids  -holding home metoprolol to allow for permissive HTN, but BP not particularly elevated, so neuro rec starting NS, which was done  -c/w ASA 325mg and atorvastatin 40mg QHS  -if sufficient evidence for afib by time of discharge and if cardio rec A/C, then neuro, Dr. Shankar, recommended holding off for 2 weeks because pt had a large ischemic CVA with concern for potential hemorrhagic conversion  -phys therapy rec LINDA - SW to work with family and pt on disposition  -TTE showing normal systolic LV function, LVEF of 55-60%, +diastolic dysfunction, moderate MR   -TSH normal, HbA1C (5.8%), lipid profile (LDL of 95, HDL of 49)  -neuro checks   -PT consult  -fall precautions  -neuro, Dr. Shankar and cardio Dr. Randle group -- recs appreciated  -carotids without significant stenosis  -may need ILR as outpt unless more clear cut runs of afib occur during this admission, as per discussion with cardio

## 2020-03-24 NOTE — DISCHARGE NOTE NURSING/CASE MANAGEMENT/SOCIAL WORK - NSDCPEPTSTRK_GEN_ALL_CORE
Stroke support groups for patients, families, and friends/Call 911 for stroke/Need for follow up after discharge/Stroke education booklet/Stroke warning signs and symptoms/Signs and symptoms of stroke/Prescribed medications/Risk factors for stroke

## 2020-03-24 NOTE — PROGRESS NOTE ADULT - SUBJECTIVE AND OBJECTIVE BOX
Neurology follow up note    STEVEN RENNER80yFemale      Interval History:    Patient feels ok no new complaints.    MEDICATIONS    aspirin enteric coated 325 milliGRAM(s) Oral daily  atorvastatin 40 milliGRAM(s) Oral at bedtime  enoxaparin Injectable 40 milliGRAM(s) SubCutaneous daily  levothyroxine 25 MICROGram(s) Oral daily  potassium phosphate IVPB 30 milliMole(s) IV Intermittent once  sodium chloride 0.9%. 1000 milliLiter(s) IV Continuous <Continuous>      Allergies    No Known Allergies    Intolerances            Vital Signs Last 24 Hrs  T(C): 37.1 (24 Mar 2020 07:38), Max: 37.4 (24 Mar 2020 04:35)  T(F): 98.7 (24 Mar 2020 07:38), Max: 99.4 (24 Mar 2020 04:35)  HR: 65 (24 Mar 2020 07:38) (65 - 87)  BP: 142/66 (24 Mar 2020 07:38) (138/76 - 160/100)  BP(mean): --  RR: 21 (24 Mar 2020 07:38) (18 - 21)  SpO2: 91% (24 Mar 2020 07:38) (91% - 96%)    REVIEW OF SYSTEMS:     Constitutional: No fever, chills, fatigue, weakness  Eyes: no eye pain, visual disturbances, or discharge  ENT:  No difficulty hearing, tinnitus, vertigo; No sinus or throat pain  Neck: No pain or stiffness  Respiratory: No cough, dyspnea, wheezing   Cardiovascular: No chest pain, palpitations,   Gastrointestinal: No abdominal or epigastric pain. No nausea, vomiting  No diarrhea or constipation.   Genitourinary: No dysuria, frequency, hematuria or incontinence  Neurological: No headaches, lightheadedness, vertigo, numbness or tremors  Psychiatric: No depression, anxiety, mood swings or difficulty sleeping  Musculoskeletal: No joint pain or swelling; No muscle, back or extremity pain  Skin: No itching, burning, rashes or lesions   Lymph Nodes: No enlarged glands  Endocrine: No heat or cold intolerance; No hair loss   Allergy and Immunologic: No hives or eczema    On Neurological Examination:    Mental Status - Patient is alert, awake      Follow simple commands  Follow complex commands      Speech -  fluent              Cranial Nerves - Pupils 3 mm equal and reactive to light,   extraocular eye movements intact. questionable per cut poorly cooperative with testing   smile symmetric  intact bilateral NLF    Motor Exam -   Right upper 5/5  Left upper 4+/5--- no drift today   Right lower 4+/5  Left lower  4/5      Muscle tone - is normal all over.  No asymmetry is seen.      Sensory    Bilateral intact to light touch    GENERAL Exam: Nontoxic , No Acute Distress   	  HEENT:  normocephalic, atraumatic  		  LUNGS: Clear bilaterally    	  HEART: Normal S1S2   No murmur RRR        	  GI/ ABDOMEN:  Soft  Non tender    EXTREMITIES:   No Edema  No Clubbing  No Cyanosis     MUSCULOSKELETAL: Normal Range of Motion  	   SKIN: Normal  No Ecchymosis                  LABS:  CBC Full  -  ( 24 Mar 2020 07:42 )  WBC Count : 9.32 K/uL  RBC Count : 3.18 M/uL  Hemoglobin : 9.9 g/dL  Hematocrit : 29.9 %  Platelet Count - Automated : 200 K/uL  Mean Cell Volume : 94.0 fl  Mean Cell Hemoglobin : 31.1 pg  Mean Cell Hemoglobin Concentration : 33.1 gm/dL  Auto Neutrophil # : x  Auto Lymphocyte # : x  Auto Monocyte # : x  Auto Eosinophil # : x  Auto Basophil # : x  Auto Neutrophil % : x  Auto Lymphocyte % : x  Auto Monocyte % : x  Auto Eosinophil % : x  Auto Basophil % : x      03-24    141  |  108  |  23  ----------------------------<  102<H>  3.7   |  25  |  0.99    Ca    8.9      24 Mar 2020 07:42  Phos  1.5     03-24  Mg     1.8     03-24    TPro  8.8<H>  /  Alb  3.2<L>  /  TBili  x   /  DBili  x   /  AST  x   /  ALT  x   /  AlkPhos  x   03-23    Hemoglobin A1C:     LIVER FUNCTIONS - ( 23 Mar 2020 09:10 )  Alb: 3.2 g/dL / Pro: 8.8 g/dL / ALK PHOS: x     / ALT: x     / AST: x     / GGT: x           Vitamin B12         RADIOLOGY      ANALYSIS AND PLAN:  An 80-year-old with a history of memory issues, episode of fall, and cerebrovascular accident.  1.	For cerebrovascular accident, the patient would not be a TPA candidate since the patient possibly awoke with symptoms in the morning.  The patient's overall NIH stroke scale would be 1.  2.	Telemetry evaluation to rule out underlying arrhythmia.  3.	 MRI imaging of the brain.  4.	aspirin after 2 weeks ok ac if needed  and ok to dc aspirin    5.	patient on statin.  6.	For history of hypertension, for now, we will allow permissive hypertensive for the next 24 hours.  Do not treat systolic blood pressure unless above 190 or diastolic above 105.  7.	For hypothyroidism, continue the patient on Synthroid.  8.	For episode of fall, questionable this was any type of presyncopal event, the patient was feeling lightheaded or from a cerebrovascular accident.  9.	For mild cognitive impairment, supportive treatment  10.	monitor electrolytes   11.	Implantable loop recorder in future for long-term monitoring for arrhythmogenic cause  12.	Spoke with the son, Valeriano, who is a healthcare proxy.  3/24/20 His telephone number is [phone].  Physical therapy evaluation if possible and as tolerated.  OOB to chair/ambulation with assistance only.    Greater than 40  minutes spent in direct patient care reviewing  the notes, lab data/ imaging , discussion with multidisciplinary team.

## 2020-03-24 NOTE — PROGRESS NOTE ADULT - SUBJECTIVE AND OBJECTIVE BOX
Date/Time Patient Seen:  		  Referring MD:   Data Reviewed	       Patient is a 80y old  Female who presents with a chief complaint of acute CVA (23 Mar 2020 16:07)      Subjective/HPI     PAST MEDICAL & SURGICAL HISTORY:  Fracture of arm: right distal radius  Essential hypertension  Ulnar fracture: s/p fixation R ulnar  No significant past surgical history        Medication list         MEDICATIONS  (STANDING):  aspirin enteric coated 325 milliGRAM(s) Oral daily  atorvastatin 40 milliGRAM(s) Oral at bedtime  enoxaparin Injectable 40 milliGRAM(s) SubCutaneous daily  levothyroxine 25 MICROGram(s) Oral daily  sodium chloride 0.9%. 1000 milliLiter(s) (75 mL/Hr) IV Continuous <Continuous>    MEDICATIONS  (PRN):         Vitals log        ICU Vital Signs Last 24 Hrs  T(C): 37.4 (24 Mar 2020 04:35), Max: 37.4 (24 Mar 2020 04:35)  T(F): 99.4 (24 Mar 2020 04:35), Max: 99.4 (24 Mar 2020 04:35)  HR: 69 (24 Mar 2020 04:35) (68 - 87)  BP: 138/76 (24 Mar 2020 04:35) (138/76 - 160/100)  BP(mean): --  ABP: --  ABP(mean): --  RR: 18 (24 Mar 2020 04:35) (18 - 18)  SpO2: 94% (24 Mar 2020 04:35) (92% - 96%)           Input and Output:  I&O's Detail    23 Mar 2020 07:01  -  24 Mar 2020 07:00  --------------------------------------------------------  IN:    Solution: 499.8 mL  Total IN: 499.8 mL    OUT:  Total OUT: 0 mL    Total NET: 499.8 mL          Lab Data                        10.5   10.53 )-----------( 217      ( 23 Mar 2020 06:06 )             32.0     03-23    140  |  107  |  23  ----------------------------<  100<H>  3.6   |  23  |  0.98    Ca    9.3      23 Mar 2020 06:06  Phos  1.6     03-23  Mg     1.9     03-23    TPro  8.8<H>  /  Alb  3.2<L>  /  TBili  x   /  DBili  x   /  AST  x   /  ALT  x   /  AlkPhos  x   03-23            Review of Systems	      Objective     Physical Examination    heart s1s2  lung dc BS      Pertinent Lab findings & Imaging      Naima:  NO   Adequate UO     I&O's Detail    23 Mar 2020 07:01  -  24 Mar 2020 07:00  --------------------------------------------------------  IN:    Solution: 499.8 mL  Total IN: 499.8 mL    OUT:  Total OUT: 0 mL    Total NET: 499.8 mL               Discussed with:     Cultures:	        Radiology

## 2020-03-24 NOTE — CONSULT NOTE ADULT - ASSESSMENT
79 yo woman admitted with clinical symptoms of right hemispheric CVA, noted to have elevated protein to albumin ratio with +SPEP for gamma migrating protein and serum immunofixation + for IgG-Kappa; noted also to have rib fractures (left 7-11th but asymptomatic)    - will check quantitative immunoglobulin levels and Kappa/Lambda Light chains  - patient with mild anemia, rib fractures and + Immunofixation clearly meets CRAB criteria for myeloma  - upon discharge and recovery from CVA, will need to be seen in office for bone marrow aspirate/biopsy and evaluation for treatment (which currently during COVID-19 pandemic would hold off therapy for now and monitor closely)  - case discussed with Dr. Riley; will discuss with family as well 79 yo woman admitted with clinical symptoms of right hemispheric CVA, noted to have elevated protein to albumin ratio with +SPEP for gamma migrating protein and serum immunofixation + for IgG-Kappa; noted also to have rib fractures (left 7-11th but asymptomatic)    - will check quantitative immunoglobulin levels and Kappa/Lambda Light chains  - patient with mild anemia, rib fractures and + Immunofixation clearly meets CRAB criteria for myeloma  - upon discharge and recovery from CVA, will need to be seen in office for bone marrow aspirate/biopsy and evaluation for treatment; would consider oral options but be cautious regarding immunosupressive medications  - case discussed with Dr. Riley; Discussed

## 2020-03-24 NOTE — PROGRESS NOTE ADULT - ASSESSMENT
81yo F w/ PMH of HTN and hypothyroidism presenting with generalized weakness, difficulty walking and dizziness admitted for acute right parietal lobe infarct    CVA  - MR Brain - Acute right MCA territorial infarct with no acute ICH  - Telemetry reviewed revealing Sr w/ PAC  - Consider Implantable loop recorder in future for long-term monitoring for arrhythmogenic cause  - May consider transesophageal echocardiography with bubble study to r/o PFO for possible cause in the future  - Blood pressure control with permissive hypertension, systolic 150-190 with diastolic <105  - Continue statin  - Cont ASA as per neurology    Elevated trops  - Not consistent with plaque rupture no need to continue trend  - TTE - mild concentric LVH with normal Systolic function, LVEF 55-60%; Moderate MR, mild TR, small pericardial effusion    HTN  - Not on any antihypertensives.  - Allow for permissive HTN treat for SBP >190 and/or diastolic >105    Anemia  - Workup as per primary team      DVT PPX  - Lovenox as per primary team    Monitor Electrolytes and replete for K+ >4 and Mag >2  All other medical needs as per primary team  Further management can be dependent on her clinical course.  Will follow    Rambo Caraballo DNP  Cardiology   Spectra #[phone]   [phone] 81yo F w/ PMH of HTN and hypothyroidism presenting with generalized weakness, difficulty walking and dizziness admitted for acute right parietal lobe infarct    CVA  - MR Brain - Acute right MCA territorial infarct with no acute ICH  - Telemetry reviewed revealing Sr w/ PAC  -ECg overnight w/ SR w/ PVC  - Consider Implantable loop recorder in future for long-term monitoring for arrhythmogenic cause  - May consider transesophageal echocardiography with bubble study to r/o PFO for possible cause in the future  - Blood pressure control with permissive hypertension, systolic 150-190 with diastolic <105  - Continue statin  - Cont ASA as per neurology    Elevated trops  - Not consistent with plaque rupture no need to continue trend  - TTE - mild concentric LVH with normal Systolic function, LVEF 55-60%; Moderate MR, mild TR, small pericardial effusion    HTN  - Not on any antihypertensives.  - Allow for permissive HTN treat for SBP >190 and/or diastolic >105    Anemia  - Workup as per primary team      DVT PPX  - Lovenox as per primary team    Monitor Electrolytes and replete for K+ >4 and Mag >2  All other medical needs as per primary team  Further management can be dependent on her clinical course.  Will follow    Rambo Caraballo DNP  Cardiology   Spectra #[phone]

## 2020-03-24 NOTE — PROGRESS NOTE ADULT - REASON FOR ADMISSION
acute CVA

## 2020-03-24 NOTE — PROGRESS NOTE ADULT - SUBJECTIVE AND OBJECTIVE BOX
[facility] -- Mario Jerry, José Miguel Randle, Ajit Walsh Savella  Office # [phone]      Follow Up:    abnormal EKG     Subjective/Observations:   No events overnight resting comfortably in bed.  No complaints of chest pain, dyspnea, or palpitations reported. No signs of orthopnea or PND.      REVIEW OF SYSTEMS: All other review of systems is negative unless indicated above    PAST MEDICAL & SURGICAL HISTORY:  Fracture of arm: right distal radius  Essential hypertension  Ulnar fracture: s/p fixation R ulnar      MEDICATIONS  (STANDING):  aspirin enteric coated 325 milliGRAM(s) Oral daily  atorvastatin 40 milliGRAM(s) Oral at bedtime  enoxaparin Injectable 40 milliGRAM(s) SubCutaneous daily  levothyroxine 25 MICROGram(s) Oral daily  potassium phosphate IVPB 30 milliMole(s) IV Intermittent once  sodium chloride 0.9%. 1000 milliLiter(s) (75 mL/Hr) IV Continuous <Continuous>    MEDICATIONS  (PRN):      Allergies    No Known Allergies    Intolerances        Vital Signs Last 24 Hrs  T(C): 37.1 (24 Mar 2020 07:38), Max: 37.4 (24 Mar 2020 04:35)  T(F): 98.7 (24 Mar 2020 07:38), Max: 99.4 (24 Mar 2020 04:35)  HR: 65 (24 Mar 2020 07:38) (65 - 87)  BP: 142/66 (24 Mar 2020 07:38) (138/76 - 160/100)  BP(mean): --  RR: 21 (24 Mar 2020 07:38) (18 - 21)  SpO2: 91% (24 Mar 2020 07:38) (91% - 96%)    I&O's Summary    23 Mar 2020 07:01  -  24 Mar 2020 07:00  --------------------------------------------------------  IN: 499.8 mL / OUT: 0 mL / NET: 499.8 mL          PHYSICAL EXAM:  TELE: SR w/ PAC  Constitutional: NAD, awake and alert, well-developed  HEENT: Moist Mucous Membranes, Anicteric  Pulmonary: Non-labored, breath sounds are clear bilaterally, No wheezing, crackles or rhonchi  Cardiovascular: Regular, S1 and S2 nl, No murmurs, rubs, gallops or clicks  Gastrointestinal: Bowel Sounds present, soft, nontender.   Lymph: No lymphadenopathy. No peripheral edema.  Skin: No visible rashes or ulcers.  Psych:  Mood & affect appropriate    LABS: All Labs Reviewed:                        9.9    9.32  )-----------( 200      ( 24 Mar 2020 07:42 )             29.9                         10.5   10.53 )-----------( 217      ( 23 Mar 2020 06:06 )             32.0                         9.5    9.02  )-----------( 205      ( 22 Mar 2020 07:40 )             28.9     24 Mar 2020 07:42    141    |  108    |  23     ----------------------------<  102    3.7     |  25     |  0.99   23 Mar 2020 06:06    140    |  107    |  23     ----------------------------<  100    3.6     |  23     |  0.98   22 Mar 2020 07:40    140    |  109    |  22     ----------------------------<  96     3.9     |  23     |  1.10     Ca    8.9        24 Mar 2020 07:42  Ca    9.3        23 Mar 2020 06:06  Ca    9.0        22 Mar 2020 07:40  Phos  1.5       24 Mar 2020 07:42  Phos  1.6       23 Mar 2020 06:06  Mg     1.8       24 Mar 2020 07:42  Mg     1.9       23 Mar 2020 06:06  Mg     1.8       22 Mar 2020 07:40    TPro  8.8    /  Alb  3.2    /  TBili  x      /  DBili  x      /  AST  x      /  ALT  x      /  AlkPhos  x      23 Mar 2020 09:10  TPro  9.6    /  Alb  2.5    /  TBili  0.4    /  DBili  x      /  AST  35     /  ALT  31     /  AlkPhos  32     23 Mar 2020 06:06  TPro  9.5    /  Alb  2.4    /  TBili  0.3    /  DBili  <.10   /  AST  36     /  ALT  38     /  AlkPhos  34     22 Mar 2020 07:40        Echo:  < from: TTE Echo Complete w/o contrast w/ Doppler (03.20.20 @ 20:29) >    OBSERVATIONS:  Technically difficult study  Mitral Valve: Thickened leaflets, moderate MR.  Aortic Valve/Aorta: normal trileaflet aortic valve.  Tricuspid Valve: normal with mild TR.  Pulmonic Valve: normal  Left Atrium: normal  Right Atrium: Not well-visualized  Left Ventricle: normal LV size and systolic function, estimated LVEF of 55-60%. LVH  Right Ventricle: Grossly normal size and systolic function.  Pericardium/Pleura: normal, small pericardial effusion without hemodynamic compromise.  Pulmonary/RV Pressure: estimated PA systolic pressure of 35 mmHg assuming an RA pressure of 8 mmHg.  LV diastolic dysfunction is present    Conclusion:   Technically difficult study  Mild concentric LVH with normal internal dimensions and systolic function, estimated LVEF of 55-60%.   Grossly normal RV size and systolic function.    Normal trileaflet aortic valve, without AI.   Moderate MR  Mild TR.    Estimated PA systolic pressure of 35 mmHg. The IVC is dilated  Small pericardial effusion without hemodynamic compromise.        < end of copied text >    < from: 12 Lead ECG (03.23.20 @ 08:54) >  Ventricular Rate 70 BPM    Atrial Rate 70 BPM    P-R Interval 154 ms    QRS Duration 86 ms    Q-T Interval 398 ms    QTC Calculation(Bezet) 429 ms    P Axis 60 degrees    R Axis -18 degrees    T Axis 19 degrees    Diagnosis Line Sinus rhythm with premature atrial complexes  Nonspecific T wave abnormality  Abnormal ECG    Confirmed by jaimee Reynolds ([id number]) on 3/23/2020 2:17:17 PM    < end of copied text >

## 2021-09-07 NOTE — PATIENT PROFILE ADULT - LAST BOWEL MOVEMENT DATE
Gen: Alert, NAD  Head/eyes: NC/AT  ENT: airway patent  Neck: supple, no tenderness/meningismus/JVD, Trachea midline  Pulm/lung: decreased breath sounds b/l, normal resp effort, no wheeze/stridor/retractions  CV/heart: RRR, no M/R/G, +2 dist pulses (radial, pedal DP/PT, popliteal)  GI/Abd: soft, NT/ND, +BS, no guarding/rebound tenderness  Musculoskeletal: no edema/erythema/cyanosis, FROM in all extremities, no C/T/L spine ttp  Skin: no rash, no vesicles, no petechaie, no ecchymosis, no swelling  Neuro: AAOx2, moving all extremities
20-Mar-2020

## 2022-09-28 NOTE — PHYSICAL THERAPY INITIAL EVALUATION ADULT - PERTINENT HX OF CURRENT PROBLEM, REHAB EVAL
[facility] MEDICINE PROGRESS NOTE   Patient: Lynsey Mcnally  Today's Date: 2022    YOB: 1961  Admission Date: 2022    MRN: [id number]  Inpatient LOS: 7    Room: [address]  Hospital Day: Hospital Day: 8    Subjective   HISTORY AND SUBJECTIVE COMPLAINTS     Chief Complaint:   Doing better today. No new complaints.     Interval History / Subjective:   none    Hospital Course:  Lynsey Mcnally is a 60 year old female who presented on 2022 with complaints of Shortness of Breath  .    ROS:  Pertinent systems negative except as above.    Objective   PHYSICAL EXAMINATION     Vital 24 Hour Range Most Recent Value   Temperature Temp  Min: 97.9 °F (36.6 °C)  Max: 98.3 °F (36.8 °C) 98.3 °F (36.8 °C)   Pulse Pulse  Min: 90  Max: 110 (!) 104   Respiratory Resp  Min: 18  Max: 19 18   Blood Pressure BP  Min: 100/58  Max: 121/61 100/58   Pulse Oximetry SpO2  Min: 92 %  Max: 98 % 92 %   Arterial BP No data recorded     O2 O2 Flow Rate (L/min)  Av L/min  Min: 4 L/min   Min taken time: 22  Max: 4 L/min   Max taken time: 22 0830       Recorded Intake and Output:    Intake/Output Summary (Last 24 hours) at 2022 1107  Last data filed at 2022 1214  Gross per 24 hour   Intake --   Output 250 ml   Net -250 ml      Recorded Last Stool Occurrence: 1 (22)     Vital Most Recent Value First Value   Weight 92.4 kg (203 lb 9.6 oz) Weight: 97.1 kg (214 lb 1.1 oz)   Height 5' 4\" (162.6 cm) Height: 5' 4\" (162.6 cm)   BMI 34.95 N/A     General: Looks well well developed, well nourished  CV: regular rate and rhythm  Resp: diminished bilateral bases  Abd: soft, nontender and nondistended  Ext: no rashes, lesions, or ulcers noted  Skin: no rashes, lesions, or ulcers noted  Neuro: CN 2-12 grossly intact  Psych: normal judgement and insight    TEST RESULTS     Labs: The Laboratory values listed below have been reviewed and pertinent findings discussed in the  Assessment and Plan.    Laboratory values:   Recent Labs   Lab 09/28/22  0516 09/27/22  0501 09/26/22  0411   WBC 11.7* 13.2* 14.2*   HGB 8.7* 8.0* 8.0*   HCT 31.3* 28.8* 28.2*   * 920* 968*       Recent Labs   Lab 09/28/22  0516 09/27/22  0501 09/26/22  0411   SODIUM 141 142 141   POTASSIUM 4.1 3.8 4.0   CHLORIDE 94* 96* 96*   CO2 43* 40* 43*   CALCIUM 9.5 8.8 8.9   GLUCOSE 88 84 81   BUN 25* 28* 27*   CREATININE 0.58 0.67 0.66        No results found    Invalid input(s): CAPTH, ALKPHOS, NH#  Recent Labs   Lab 09/23/22  0431   PCT 0.17*     Recent Labs   Lab 09/27/22  1247 09/27/22  1845 09/28/22  0802   GLUCOSE BEDSIDE 93 100* 80         Recent Labs   Lab 09/25/22  0502   NTPROB 312*       Lab Results   Component Value Date    VB12 437 04/27/2022    CHARLI >24.0 04/27/2022    VITD25 82.6 04/27/2022    TSH 1.269 09/21/2022    HGBA1C 5.7 (H) 04/27/2022        Lab Results   Component Value Date    CHOLESTEROL 164 04/27/2022    HDL 61 04/27/2022    CALCLDL 61 04/27/2022        Lab Results   Component Value Date    USPG 1.013 09/21/2022    UPROT 100  (A) 09/21/2022    UWBC Moderate (A) 09/21/2022    URBC Moderate (A) 09/21/2022    UNITR Negative 09/21/2022    UPH 5.0 09/21/2022    UBACTRA Large (A) 09/21/2022       No results found      Radiology: Imaging studies have been reviewed and pertinent findings discussed in the Assessment and Plan.  No results found for any visits on 09/21/22 (from the past 48 hour(s)).     ANCILLARY ORDERS     Diet:  Consistent Carb [place] (60-90 Gm/meal) Diet  Telemetry: On  Consults:    IP CONSULT TO PULMONOLOGY  IP CONSULT TO PALLIATIVE CARE  Therapy Orders:   PT and OT Orders Placed this Encounter   Procedures   • Occupational Therapy   • Physical Therapy       ADVANCED DIRECTIVES     Code Status: Selective Treatment/DNR         ASSESSMENT AND PLAN     Acute hypoxemic and hyper capneic respiratory failure - on 4 L oxygen at baseline. Improved. Tolerating BIPAP prn and nighttime.      UTI- On cefepime will switch to orals.     Very severe COPD- on LABA/ICS and LAMA.     Acute on chronic congestive failure- continue to diurese with lasix.       Intake/Output Summary (Last 24 hours) at 9/28/2022 1107  Last data filed at 9/27/2022 1214  Gross per 24 hour   Intake --   Output 250 ml   Net -250 ml         Smoking status: non smoker    Nutrition status: appropriate  Body mass index is 34.95 kg/m². - Obese (BMI 30-39 without a comorbid condition or 30-34 with a comorbid condition)  DVT Prophylaxis: Xarelto         DISCHARGE PLANNING     The patient's treatment plans were discussed with patient.     Recommendations for Discharge   SW     PT Home with Home therapy   OT Home with Home therapy   SLP        Anticipated discharge destination: Home  Expected Discharge Date:  09/29/2022  Barriers to Discharge: Patient is not medically ready and needs to remain in the hospital today due to patient deconditioned.         Kayden Mccabe MD  Hospitalist  9/28/2022  11:07 AM      Pt presented to ED with weakness, slurred speech, and inability to ambulate.  MRI brain (+) acute right MCA infarct.